# Patient Record
Sex: MALE | Race: WHITE | NOT HISPANIC OR LATINO | Employment: OTHER | URBAN - METROPOLITAN AREA
[De-identification: names, ages, dates, MRNs, and addresses within clinical notes are randomized per-mention and may not be internally consistent; named-entity substitution may affect disease eponyms.]

---

## 2017-02-03 ENCOUNTER — OFFICE VISIT (OUTPATIENT)
Dept: FAMILY MEDICINE | Facility: CLINIC | Age: 82
End: 2017-02-03

## 2017-02-03 VITALS
HEART RATE: 92 BPM | DIASTOLIC BLOOD PRESSURE: 75 MMHG | OXYGEN SATURATION: 95 % | WEIGHT: 153 LBS | TEMPERATURE: 97.5 F | BODY MASS INDEX: 24.33 KG/M2 | SYSTOLIC BLOOD PRESSURE: 129 MMHG

## 2017-02-03 DIAGNOSIS — Z01.00 VISION SCREEN WITHOUT ABNORMAL FINDINGS: Primary | ICD-10-CM

## 2017-02-03 PROBLEM — N18.30 CKD (CHRONIC KIDNEY DISEASE) STAGE 3, GFR 30-59 ML/MIN (H): Status: ACTIVE | Noted: 2017-02-03

## 2017-02-03 NOTE — PROGRESS NOTES
HPI:       Jerardo Paredes is a 89 year old  male who presents to address the following concerns:    1) Forms:  [facility]  -dx supporting: genralized decline, hx CVA, mild cognitive decline    2) Labs; had complimentary labs and ecg through outside agency and Cr 1.5.  Last Cr here below    3) Urinary issues: chronic.  Discussed straight cath in past for sx retention but has not followed up for education.  Tried to get home care nurse to assist but this was not possible.  [facility] agreed to have nurse see for education.  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    Current Outpatient Prescriptions   Medication     tamsulosin (FLOMAX) 0.4 MG 24 hr capsule     aspirin 81 MG chewable tablet     simvastatin (ZOCOR) 40 MG tablet     No current facility-administered medications for this visit.              Review of Systems:   ROS as described above.  Denies F/S/C/N/V/SOB/CP          Physical Exam:     Filed Vitals:    02/03/17 1024   BP: 129/75   Pulse: 92   Temp: 97.5  F (36.4  C)   TempSrc: Oral   Weight: 153 lb (69.4 kg)   SpO2: 95%     Body mass index is 24.33 kg/(m^2).    GEN: patient sitting comfortably in NAD.  Frail appearing  HEEN:  Head is atraumatic, normocephalic, eyes anicteric, mucous membranes moist  CV: RRR w/o M/R/G  PULM: CTAB without w/r/r  ABD: soft, nontender, bowel sounds present  NEURO: Alert and oriented x3. No focal motor abnormalities. Face symmetric.  PSYCH: appropriate  SKIN: No rashes, bruising, or other lesions      Results for orders placed or performed in visit on 04/18/16   Procalcitonin ([facility])   Result Value Ref Range    Procalcitonin <0.05 0.00 - 0.49 ng/mL    Narrative    Test performed by:  [facility]OR[facility]  [address] [place] [address]   CBC with Plt ([facility])   Result Value Ref Range    WBC 6.7 4.0 - 11.0 K/uL    RBC 4.77 4.40 - 5.90 M/uL    Hemoglobin 14.6 13.3 - 17.7 g/dL    Hematocrit 46.3 40.0 - 53.0 %    MCV 97.1 78.0 - 100.0 fL    MCH 30.6 26.5 - 35.0 pg    MCHC 31.5 (L) 32.0 - 36.0 g/dL    Platelets 190.0 150.0 - 450.0 K/uL   Basic Metabolic Panel ([facility])  - Results < 1 hr   Result Value Ref Range    Glucose 161.0 (H) 60.0 - 109.0 mg/dL    Urea Nitrogen 23.0 7.0 - 30.0 mg/dL    Creatinine 1.4 0.8 - 1.5 mg/dL    Sodium 138.0 133.0 - 144.0 mmol/L    Potassium 4.2 3.4 - 5.3 mmol/L    Chloride 106.0 94.0 - 109.0 mmol/L    Carbon Dioxide 30.0 20.0 - 32.0 mmol/L    Calcium 9.1 8.5 - 10.4 mg/dL    eGFR Calculated (Non Black Reference) 50.6 (L) >60.0 mL/min    eGFR Calculated (Black Reference) 61.2 >60.0 mL/min       Assessment and Plan     1) Forms: [facility].  Completed today with patient in room  2) CKD: last Cr 1.5 with eGFR 32.  Consistent with slow decline previously noted.  Continue to monitor.  Continue to encourage hydration  3) Urinary incontinence: patient would like to try straight cath.  Will help arrange for Ed with [facility]. Daugther to take patient.  Have discussed risk benefits PSA and have declined in the past.  Patient has enlarged prostate on my exam    Options for treatment and follow-up care were reviewed with the patient and/or guardian. Jerardo Paredes and/or  guardian engaged in the decision making process and verbalized understanding of the options discussed and agreed with the final plan.    Gloria Deshpande MD

## 2017-02-03 NOTE — MR AVS SNAPSHOT
After Visit Summary   2/3/2017    Jerardo Paredes    MRN: [id number]           Patient Information     Date Of Birth          4/3/1926        Visit Information        Provider Department      2/3/2017 10:20 AM Gloria Deshpande MD [facility]        Today's Diagnoses     Vision screen without abnormal findings    -  1       Care Instructions    ~ Recommend making an appointment with [facility] for catheter teaching at you and your family's earliest convenience.   ~ Forms for [facility] filled out.    ~~~~~~~~~~~~~~~~~~~~~~~~~~~~~~~~~~~~~~~~~~~~~~~~~~~~~~~~~~~~~~~~~~~~~~~~~~~~   Your medication list is printed, please keep this with you, it is helpful to bring this current list to any other medical appointments, the emergency room or hospital.    If you had lab testing today and your results are reassuring or normal they will be be mailed to you within 7 days.     If the lab tests need quick action we will call you with the results.   The phone number we will call with results is # [phone] (home) . If this is not the best number please call our clinic and change the number.    If you need any refills please call your pharmacy and they will contact us.    If you have any further concerns or wish to schedule another appointment you must call our office during normal business hours  [phone] (8-5:00 M-F)  If you have urgent medical questions that cannot wait  you may also call [phone] at any time of day.  If you have a medical emergency please call [phone].    Thank you for coming to [facility].          Follow-ups after your visit        Who to contact     Please call your clinic at [phone] to:    Ask questions about your health    Make or cancel appointments    Discuss your medicines    Learn about your test results    Speak to your doctor   If you have compliments or concerns about an experience at your clinic, or if you wish to file a complaint, please  contact [facility] Patient Relations at [phone] or email us at [email or web address]         Additional Information About Your Visit        [email or web address].[email or web address] Information     DataStax is an electronic gateway that provides easy, online access to your medical records. With DataStax, you can request a clinic appointment, read your test results, renew a prescription or communicate with your care team.     To sign up for DataStax visit the website at [email or web address]   You will be asked to enter the access code listed below, as well as some personal information. Please follow the directions to create your username and password.     Your access code is: [email or web address]  Expires: 2017 10:50 AM     Your access code will  in 90 days. If you need help or a new code, please contact your [facility] or call [phone] for assistance.        Care EveryWhere ID     This is your Care EveryWhere ID. This could be used by other organizations to access your [facility] medical records  [id number]        Your Vitals Were     Pulse Temperature Pulse Oximetry             92 97.5  F (36.4  C) (Oral) 95%          Blood Pressure from Last 3 Encounters:   17 129/75   10/25/16 131/68   16 114/70    Weight from Last 3 Encounters:   17 153 lb (69.4 kg)   10/25/16 149 lb 9.6 oz (67.858 kg)   16 149 lb 3.2 oz (67.677 kg)              We Performed the Following     SCREENING, VISUAL ACUITY, QUANTITATIVE, BILAT        Primary Care Provider Office Phone # Fax #    Gloria Deshpande [phone]       [facility] [address] [place] [address]        Thank you!     Thank you for choosing [facility]  for your care. Our goal is always to provide you with excellent care. Hearing back from our patients is one way we can continue to improve our services. Please take a few minutes to complete the written  survey that you may receive in the mail after your visit with us. Thank you!             Your Updated Medication List - Protect others around you: Learn how to safely use, store and throw away your medicines at www.disposemymeds.org.          This list is accurate as of: 2/3/17 10:50 AM.  Always use your most recent med list.                   Brand Name Dispense Instructions for use    aspirin 81 MG chewable tablet      Take 162 mg by mouth daily       simvastatin 40 MG tablet    ZOCOR    90 tablet    Take 1 tablet (40 mg) by mouth At Bedtime       tamsulosin 0.4 MG capsule    FLOMAX    60 capsule    Take 2 capsules (0.8 mg) by mouth daily

## 2017-02-03 NOTE — PATIENT INSTRUCTIONS
~ Recommend making an appointment with [facility] for catheter teaching at you and your family's earliest convenience.   ~ Forms [facility] filled out.    ~~~~~~~~~~~~~~~~~~~~~~~~~~~~~~~~~~~~~~~~~~~~~~~~~~~~~~~~~~~~~~~~~~~~~~~~~~~~   Your medication list is printed, please keep this with you, it is helpful to bring this current list to any other medical appointments, the emergency room or hospital.    If you had lab testing today and your results are reassuring or normal they will be be mailed to you within 7 days.     If the lab tests need quick action we will call you with the results.   The phone number we will call with results is # [phone] (home) . If this is not the best number please call our clinic and change the number.    If you need any refills please call your pharmacy and they will contact us.    If you have any further concerns or wish to schedule another appointment you must call our office during normal business hours  [phone] (8-5:00 M-F)  If you have urgent medical questions that cannot wait  you may also call [phone] at any time of day.  If you have a medical emergency please call [phone].    Thank you for coming to [facility].      Urology Referral from 12/2016 has be faxed to [facility] [place]. Daughter Attila will call to schedule appointment for education of self catheterization.   ---GUIDO Coy 2/3/2017

## 2017-05-01 ENCOUNTER — MEDICAL CORRESPONDENCE (OUTPATIENT)
Dept: HEALTH INFORMATION MANAGEMENT | Facility: CLINIC | Age: 82
End: 2017-05-01

## 2017-05-10 ENCOUNTER — OFFICE VISIT (OUTPATIENT)
Dept: FAMILY MEDICINE | Facility: CLINIC | Age: 82
End: 2017-05-10

## 2017-05-10 VITALS
OXYGEN SATURATION: 95 % | BODY MASS INDEX: 24.01 KG/M2 | HEIGHT: 67 IN | TEMPERATURE: 97.4 F | WEIGHT: 153 LBS | HEART RATE: 74 BPM | DIASTOLIC BLOOD PRESSURE: 71 MMHG | SYSTOLIC BLOOD PRESSURE: 120 MMHG | RESPIRATION RATE: 18 BRPM

## 2017-05-10 DIAGNOSIS — Z23 IMMUNIZATION DUE: ICD-10-CM

## 2017-05-10 DIAGNOSIS — Z01.818 PREOP GENERAL PHYSICAL EXAM: Primary | ICD-10-CM

## 2017-05-10 LAB
BUN SERPL-MCNC: 17 MG/DL (ref 7–30)
CALCIUM SERPL-MCNC: 8.9 MG/DL (ref 8.5–10.4)
CHLORIDE SERPLBLD-SCNC: 105 MMOL/L (ref 94–109)
CO2 SERPL-SCNC: 27 MMOL/L (ref 20–32)
CREAT SERPL-MCNC: 1.2 MG/DL (ref 0.8–1.5)
EGFR CALCULATED (BLACK REFERENCE): 73 ML/MIN
EGFR CALCULATED (NON BLACK REFERENCE): 60.3 ML/MIN
GLUCOSE SERPL-MCNC: 97 MG/DL (ref 60–109)
POTASSIUM SERPL-SCNC: 4.5 MMOL/L (ref 3.4–5.3)
SODIUM SERPL-SCNC: 141 MMOL/L (ref 133–144)

## 2017-05-10 NOTE — PATIENT INSTRUCTIONS
Pre-op faxed to fax number :  [phone]  Location :  [facility]   Date of Surgery :  5/17/17  By/Date :  Ese Hopeshoaib [place] [facility]/5/10/17             Presurgery Checklist  You are scheduled to have surgery. The healthcare staff will try to make your stay comfortable. Use the guidelines below to remind yourself what to do before surgery. Be sure to follow any specific pre-op instructions from your surgeon or nurse.   Preparing for Surgery  Ask your surgeon if you ll need a blood transfusion during surgery and if so, how to prepare for it. In some cases, you can donate blood before surgery. If needed, this blood can be given back (transfused) to you during or after surgery.  If you are having abdominal surgery, ask what you need to do to clear your bowel.  Tell your surgeon if you have allergies to any medications or foods.  Arrange for an adult family member or friend to drive you home after surgery. If possible, have someone ready to help you at home as you recover.  Call the surgeon if you get a cold, fever, sore throat, diarrhea, or other health problem just before surgery. Your surgeon can decide whether or not to postpone the surgery.  Medications  Tell your surgeon about all medications you take, including prescription and over-the-counter products such as herbal remedies and vitamins. Ask if you should continue taking them.  If you take ibuprofen, naproxen, or  blood thinners  such as aspirin, clopidogrel (Plavix), or warfarin (Coumadin), ask your surgeon whether you should stop taking them and how long before surgery you should stop.  You may be told to take antibiotics just before surgery to prevent infection. If so, follow instructions carefully on how to take them.  If you are told to take medications called anticoagulants to prevent blood clots after surgery, be sure to follow the instructions on how to take them.  Stop Smoking  If you smoke, healing may take longer. So at least 2 week(s)  before surgery, stop smoking.  Bathing or Showering Before Surgery  If instructed, wash with antibacterial soap. Afterward, do not use lotions or powders.  If you are having surgery on the head, you may be asked to shampoo with antibacterial soap. Follow instructions for doing so.  Do Not Remove Hair from the Surgery Site  Do not shave hair from the incision site, unless you are given specific instructions to do so. Usually, if hair needs to be removed, it will be done at the hospital right before surgery.  Don t Eat or Drink  Your doctor will tell you when to stop eating and drinking. If you do not follow your doctor's instructions, your procedure may be postponed or rescheduled for another day.  If your surgeon tells you to continue any medications, take them with small sips of water.  You can brush your teeth and rinse your mouth, but don t swallow any water.  Day of Surgery  Do not wear makeup. Do not use perfume, deodorant, or hairspray. Remove nail polish and artificial nails.  Leave jewelry (including rings), watches, and other valuables at home.  Be sure to bring health insurance cards or forms and a photo ID.  Bring a list of your medications (include the name, dose, how often you take them, and the time last dose was taken).  Arrive on time at the hospital or surgery facility.

## 2017-05-10 NOTE — MR AVS SNAPSHOT
After Visit Summary   5/10/2017    Jerardo Paredes    MRN: [id number]           Patient Information     Date Of Birth          4/3/1926        Visit Information        Provider Department      5/10/2017 10:40 AM Alesia Barker MD [facility]        Today's Diagnoses     Preop general physical exam    -  1      Care Instructions      Presurgery Checklist  You are scheduled to have surgery. The healthcare staff will try to make your stay comfortable. Use the guidelines below to remind yourself what to do before surgery. Be sure to follow any specific pre-op instructions from your surgeon or nurse.   Preparing for Surgery  Ask your surgeon if you ll need a blood transfusion during surgery and if so, how to prepare for it. In some cases, you can donate blood before surgery. If needed, this blood can be given back (transfused) to you during or after surgery.  If you are having abdominal surgery, ask what you need to do to clear your bowel.  Tell your surgeon if you have allergies to any medications or foods.  Arrange for an adult family member or friend to drive you home after surgery. If possible, have someone ready to help you at home as you recover.  Call the surgeon if you get a cold, fever, sore throat, diarrhea, or other health problem just before surgery. Your surgeon can decide whether or not to postpone the surgery.  Medications  Tell your surgeon about all medications you take, including prescription and over-the-counter products such as herbal remedies and vitamins. Ask if you should continue taking them.  If you take ibuprofen, naproxen, or  blood thinners  such as aspirin, clopidogrel (Plavix), or warfarin (Coumadin), ask your surgeon whether you should stop taking them and how long before surgery you should stop.  You may be told to take antibiotics just before surgery to prevent infection. If so, follow instructions carefully on how to take them.  If you are told to take  medications called anticoagulants to prevent blood clots after surgery, be sure to follow the instructions on how to take them.  Stop Smoking  If you smoke, healing may take longer. So at least 2 week(s) before surgery, stop smoking.  Bathing or Showering Before Surgery  If instructed, wash with antibacterial soap. Afterward, do not use lotions or powders.  If you are having surgery on the head, you may be asked to shampoo with antibacterial soap. Follow instructions for doing so.  Do Not Remove Hair from the Surgery Site  Do not shave hair from the incision site, unless you are given specific instructions to do so. Usually, if hair needs to be removed, it will be done at the hospital right before surgery.  Don t Eat or Drink  Your doctor will tell you when to stop eating and drinking. If you do not follow your doctor's instructions, your procedure may be postponed or rescheduled for another day.  If your surgeon tells you to continue any medications, take them with small sips of water.  You can brush your teeth and rinse your mouth, but don t swallow any water.  Day of Surgery  Do not wear makeup. Do not use perfume, deodorant, or hairspray. Remove nail polish and artificial nails.  Leave jewelry (including rings), watches, and other valuables at home.  Be sure to bring health insurance cards or forms and a photo ID.  Bring a list of your medications (include the name, dose, how often you take them, and the time last dose was taken).  Arrive on time at the hospital or surgery facility.        Follow-ups after your visit        Who to contact     Please call your clinic at [phone] to:    Ask questions about your health    Make or cancel appointments    Discuss your medicines    Learn about your test results    Speak to your doctor   If you have compliments or concerns about an experience at your clinic, or if you wish to file a complaint, please contact [facility] Patient Relations at  "[phone] or email us at [email or web address]         Additional Information About Your Visit        Snap Trends Information     Snap Trends is an electronic gateway that provides easy, online access to your medical records. With Snap Trends, you can request a clinic appointment, read your test results, renew a prescription or communicate with your care team.     To sign up for Snap Trends visit the website at [email or web address]   You will be asked to enter the access code listed below, as well as some personal information. Please follow the directions to create your username and password.     Your access code is: [email or web address]  Expires: 2017 11:42 AM     Your access code will  in 90 days. If you need help or a new code, please contact your [facility] or call [phone] for assistance.        Care EveryWhere ID     This is your Care EveryWhere ID. This could be used by other organizations to access your [place] medical records  [id number]        Your Vitals Were     Pulse Temperature Respirations Height Pulse Oximetry BMI (Body Mass Index)    74 97.4  F (36.3  C) (Tympanic) 18 5' 6.5\" (168.9 cm) 95% 24.32 kg/m2       Blood Pressure from Last 3 Encounters:   05/10/17 120/71   17 129/75   10/25/16 131/68    Weight from Last 3 Encounters:   05/10/17 153 lb (69.4 kg)   17 153 lb (69.4 kg)   10/25/16 149 lb 9.6 oz (67.9 kg)              We Performed the Following     Basic Metabolic Panel ([facility]) - Results < 1 hr        Primary Care Provider Office Phone # Fax #    Gloria Deshpande [phone]       [facility] [address] [place] [id number]        Thank you!     Thank you for choosing [facility]  for your care. Our goal is always to provide you with excellent care. Hearing back from our patients is one way we can continue to improve our services. Please take a few minutes to complete the written survey " that you may receive in the mail after your visit with us. Thank you!             Your Updated Medication List - Protect others around you: Learn how to safely use, store and throw away your medicines at www.disposemymeds.org.          This list is accurate as of: 5/10/17 11:42 AM.  Always use your most recent med list.                   Brand Name Dispense Instructions for use    aspirin 81 MG chewable tablet      Take 162 mg by mouth daily       simvastatin 40 MG tablet    ZOCOR    90 tablet    Take 1 tablet (40 mg) by mouth At Bedtime       tamsulosin 0.4 MG capsule    FLOMAX    60 capsule    Take 2 capsules (0.8 mg) by mouth daily

## 2017-05-10 NOTE — PROGRESS NOTES
[facility] [address] [place] [address]  Phone: [phone]  Fax: [phone]    5/10/2017    Adult PRE-OP Evaluation:    Jerardo Paredes, 4/3/1926 presents for pre-operative evaluation and assessment as requested by Dr. Perez, prior to undergoing surgery/procedure for treatment of  Urinary retention .    Proposed procedure: Stage I inter stim implant device    Date of Surgery/ Procedure: 5/17/17  Hospital/Surgical Facility: [facility], Fax: [phone]     Primary Physician: Gloria Deshpande  Type of Anesthesia Anticipated: Local with MAC  History of anesthesia complications: NONE  History of  abnormal bleeding: NONE   History of blood transfusions: NO  Patient has a Health Care Directive or Living Will:  YES DNR/I    Preoperative Questions   1. YES - Do you have a history of heart attack, stroke, stent, bypass or surgery on an artery in the head, neck, heart or legs? Yes, stroke in 2014.   2. NO - Do you ever have any pain or discomfort in your chest?  3. NO - Have you ever had a severe pain across the front of your chest lasting for half an hour or more?  4. NO - Do you have a history of Congestive Heart Failure?  5. NO - Are you troubled by shortness of breath when: walking on the level/ up a slight hill/ at night?  6. NO - Does your chest ever sound wheezy or whistling?  7. NO - Do you currently have a cold, bronchitis or other respiratory infection?  8. NO - Have you had a cold, bronchitis or other respiratory infection within the last 2 weeks?  9. NO - Do you usually have a cough?  10. NO - Do you sometimes get pains in the calves of your legs when you walk?  11. NO - Do you or anyone in your family have previous history of blood clots?  12. NO - Do you or does anyone in your family have a serious bleeding problem such as prolonged bleeding following surgeries or cuts?  13. NO - Have you ever had problems with anemia or been told to take iron pills?  14. NO - Have  you had any abnormal blood loss such as black, tarry or bloody stools, or abnormal vaginal bleeding?  15. NO - Have you ever had a blood transfusion?  16. NO - Have you or any of your relatives ever had problems with anesthesia?  17. NO - Do you have sleep apnea, excessive snoring or daytime drowsiness?  18. NO - Do you have any prosthetic heart valves?  19. NO - Do you have prosthetic joints?  20. NO - Is there any chance that you may be pregnant?    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     OTC products: None, except as noted above    Allergies   Allergen Reactions     No Known Drug Allergy      Latex Allergy: NO    Social History     Social History     Marital status: Single     Spouse name: N/A     Number of children: N/A     Years of education: N/A     Social History Main Topics     Smoking status: Former Smoker     Smokeless tobacco: Never Used      Comment: quit for a long time per pt.     Alcohol use 0.0 oz/week     0 Standard drinks or equivalent per week      Comment: Occasionally.      Drug use: No     Sexual activity: Not Asked       REVIEW OF SYSTEMS:   Constitutional, HEENT, cardiovascular, pulmonary, gi and gu systems are negative, except as otherwise noted.    EXAM:   Patient Vitals for the past 24 hrs:   BP Temp Temp src Pulse Resp SpO2 Height Weight  "  05/10/17 [facility] 120/71 97.4  F (36.3  C) Tympanic 74 18 95 % 5' 6.5\" (168.9 cm) 153 lb (69.4 kg)     Body mass index is 24.32 kg/(m^2).  GENERAL: healthy, alert and no distress  EYES: Eyes grossly normal to inspection, extraocular movements - intact, and PERRL  HENT: ear canals- normal;  Nose- normal; Mouth- no ulcers, no lesions  NECK: no tenderness, no adenopathy, no asymmetry, no masses, no stiffness; thyroid- normal to palpation  RESP: lungs clear to auscultation - no rales, no rhonchi, no wheezes  CV: regular rates and rhythm, normal S1 S2, no S3 or S4 and no murmur, no click or rub -  ABDOMEN: soft, no tenderness, no  hepatosplenomegaly, no masses, normal bowel sounds  MS: extremities- no gross deformities noted, no edema  SKIN: no suspicious lesions, no rashes  NEURO: strength and tone- normal, sensory exam- grossly normal, mentation- intact, speech- normal  PSYCH: Alert and oriented times 3; speech- coherent , normal rate and volume; able to articulate logical thoughts    DIAGNOSTICS:      EKG: First degree AV block, Sinus rhythm with vent rate of 59 bpm, Slightly leftward axis, no LVH by voltage criteria, Right Bundle Branch Block, unchanged from previous tracing description dated 3/19/14 available in Care Everywhere but cannot see the image.     RISK ASSESSMENT:     Cardiovascular Risk:  -Patient is able to walk up a flight of stairs without chest pain.  -The patient does not have chest pain with exertion.  -Patient does not have a history of congestive heart failure.    -The patient does have a history of stroke and does not have a history of valvular disease.    Pulmonary Risk:  -In terms of risk factors for pulmonary complication, the patient is older then 60    Perioperative Complications:  -The patient does not have a history of bleeding or clotting problems in the past.    -The patient has not had complications from surgeries.    -The patient does not have a family history of any anesthesia or " surgical complications.      IMPRESSION:   Reason for surgery/procedure: urinary retention.     The proposed surgical procedure is considered LOW risk.    For above listed surgery and anesthesia:   Patient is at low risk for surgery/procedure and perioperative/procedure complications.    RECOMMENDATIONS:     Labs:  BMP is wnl. Cr stable at 1.2, electrolytes wnl.     Fasting:  NPO for 12 hours prior to surgery    Preop Plan:  --Approval given to proceed with proposed procedure, without further diagnostic evaluation  Patient is not currently taking any medications.   Recommend he not start taking his aspirin.     Medications:  Patient should hold their regular medications the morning of surgery unless otherwise instructed.    Hold aspirin 7 days prior to surgery.      Alesia Barker MD    Please contact our office if there are any further questions or information required about this patient.    Precepted today with: Clint Guan MD    Preceptor Attestation:  I saw and evaluated the patient.  I reviewed the resident physician's history, exam, and treatment plan; and I agree with the documentation by the resident physician.  Supervising Physician:  Clint Guan MD      I reviewed and interpreted the EKG at the time of visit with Dr. Barker.  Clint Guan MD

## 2017-05-16 ASSESSMENT — MIFFLIN-ST. JEOR: SCORE: 1279.69

## 2017-05-17 ENCOUNTER — SURGERY - HEALTHEAST (OUTPATIENT)
Dept: SURGERY | Facility: HOSPITAL | Age: 82
End: 2017-05-17

## 2017-05-17 ENCOUNTER — ANESTHESIA - HEALTHEAST (OUTPATIENT)
Dept: SURGERY | Facility: HOSPITAL | Age: 82
End: 2017-05-17

## 2017-06-02 ENCOUNTER — ANESTHESIA - HEALTHEAST (OUTPATIENT)
Dept: SURGERY | Facility: HOSPITAL | Age: 82
End: 2017-06-02

## 2017-06-02 ASSESSMENT — MIFFLIN-ST. JEOR: SCORE: 1266.09

## 2017-06-05 ENCOUNTER — SURGERY - HEALTHEAST (OUTPATIENT)
Dept: SURGERY | Facility: HOSPITAL | Age: 82
End: 2017-06-05

## 2018-01-30 ENCOUNTER — TELEPHONE (OUTPATIENT)
Dept: FAMILY MEDICINE | Facility: CLINIC | Age: 83
End: 2018-01-30

## 2018-01-30 NOTE — TELEPHONE ENCOUNTER
I got a message from Stevan, the  staff about the pt coming in for a Medicare Wellness check up.  I called the pt to ask him the questionnaire, talked to the pt daughter. She stated that if I could mail her the questionnaire and she can ask her father the questions this weekend.  I mailed the pt the questionnaire, and she will ask her father and bring it in with her when they come in for the appointment.

## 2018-02-23 ENCOUNTER — TELEPHONE (OUTPATIENT)
Dept: FAMILY MEDICINE | Facility: CLINIC | Age: 83
End: 2018-02-23
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
## 2018-02-23 NOTE — TELEPHONE ENCOUNTER
[facility] phone call message- general phone call:    Reason for call: Patients daughter is calling to give an update on the patient as she would not like to discuss these changes in front of the pt on his next upcoming appt. Please call her at [phone].    Return call needed: Yes    OK to leave a message on voice mail? Yes    Primary language: English      needed? No    Call taken on February 23, 2018 at 10:41 AM by Gricelda Garcia  Patients daughter is calling to give an update on the patient as she would not like to discuss these changes in front of the pt on his next upcoming appt:

## 2018-03-05 ENCOUNTER — OFFICE VISIT (OUTPATIENT)
Dept: FAMILY MEDICINE | Facility: CLINIC | Age: 83
End: 2018-03-05
Payer: COMMERCIAL

## 2018-03-05 VITALS
WEIGHT: 152.6 LBS | RESPIRATION RATE: 16 BRPM | HEART RATE: 84 BPM | SYSTOLIC BLOOD PRESSURE: 124 MMHG | BODY MASS INDEX: 24.26 KG/M2 | TEMPERATURE: 97.5 F | DIASTOLIC BLOOD PRESSURE: 77 MMHG | OXYGEN SATURATION: 94 %

## 2018-03-05 DIAGNOSIS — Z00.00 WELLNESS EXAMINATION: Primary | ICD-10-CM

## 2018-03-05 DIAGNOSIS — M17.31 POST-TRAUMATIC OSTEOARTHRITIS OF RIGHT KNEE: Primary | ICD-10-CM

## 2018-03-05 DIAGNOSIS — Z00.00 ROUTINE GENERAL MEDICAL EXAMINATION AT A HEALTH CARE FACILITY: ICD-10-CM

## 2018-03-05 NOTE — MR AVS SNAPSHOT
After Visit Summary   3/5/2018    Jerardo Paredes    MRN: [id number]           Patient Information     Date Of Birth          4/3/1926        Visit Information        Provider Department      3/5/2018 10:00 AM Gloria Deshpande MD [facility]        Today's Diagnoses     Post-traumatic osteoarthritis of right knee    -  1      Care Instructions     PERSONAL PREVENTIVE SERVICES PLAN - SERVICES     Review these tests with your medical staff then decide which ones you want and take this page home for your reference      SCREENING TESTS     Description   Year of Last Screening   Recommended Today?   Heart disease screening blood tests    Cholesterol level Reducing cholesterol can reduce your risk of heart attacks by 25%.  Screening is recommended yearly if you are at risk of heart disease otherwise every 4-5 years 2014 No; is up to date.   Diabetes screening tests    Hemoglobin A1c blood test   Finding and treating diabetes early can reduce complications.  Screening recommended/covered yearly if you have high blood pressure, high cholesterol, obesity (BMI >30), or a history of high blood glucose tests; or 2 of the following: family history of diabetes, overweight (BMI >25 but <30), age 65 years or older, and a history of diabetes of pregnancy or gave birth to baby weighing more than 9 lbs. 2012 Yes; Recommended    Hepatitis B screening Finding hepatitis B early can reduce complications.  Screening is recommended for persons with selected risk factors.  No: is not indicated today.   Hepatitis C screening Finding hepatitis C early can reduce complications.  Screening is recommended for all persons born from 1945 through 1965 and for those with selected other risk factors.   No: is not indicated today.   HIV screening Finding HIV early can reduce complications.  Screening is recommended for persons with risk factors for HIV infection.  No: is not indicated today.   Glaucoma screening Early  detection of glaucoma can prevent blindness.   Please talk to your eye doctor about this.       SCREENING TESTS     Description   Year of Last Screening   Recommended Today?   Colorectal cancer screening    Fecal occult blood test     Screening colonoscopy Screening for colon cancer has been shown to reduce death from colon cancer by 25-30%. Screening recommended to start at 50 years and continuing until age 75 years.    No: is not indicated today.   Breast Cancer Screening (women)    Mammogram Mammogram screening for breast cancer has been shown to reduce the risk of dying from breast cancer and prolong life. Screening is recommended every 1-2 years for women aged 50 to 74 years.   No: is not indicated today.   Cervical Cancer screening (women)    Pap Cervical pap smears can reduce cervical cancer. Screening is recommended annually if high risk (history of abnormal pap smears) otherwise every 2-3 years, stop screening at 65 years of age if history of normal paps.  No: is not indicated today.   Screening for Osteoporosis:  Bone mass measurements (women)    Dexa Scan Screening and treating Osteoporosis can reduce the risk of hip and spine fractures. Screening is recommended in women 65 years or older and in women and men at risk of osteoporosis.  No: is not indicated today.   Screening for Lung Cancer     Low-dose CT scanning Screening can reduce mortality in persons aged 55-80 who have smoked at least 30 pack-years and who are either still smoking or have quit in the past 15 years.  No: is not indicated today.   Abdominal Aortic Aneurysm (AAA) screening    Ultrasound (US)   An aneurysm treated before rupture is very safe -a ruptured aneurysm can be fatal.  Screening  by US for AAA is limited to patients who meet one of the following criteria:    Men who are 65-75 years old and have smoked more than 100 cigarettes in their lifetime    Anyone with a family history of abdominal aortic aneurysm  No: is not indicated  today.     Here are your recommended immunizations.  Take this home for your reference.                                                    IMMUNIZATIONS Description Recommend today?     Influenza (Flu shot) Prevents flu; should get every year No; is up to date.   PCV 13 Pneumonia vaccination; you get it once No; is up to date.   PPSV 23 Second pneumonia vaccination; usually get it 1 year after PCV 13 No; is up to date.   Zoster (Shingles) Prevents shingles; you get it once  (Check with Part D insurance for coverage, must receive at a pharmacy, not clinic) No; is up to date.   Tetanus Prevents tetanus; once every 10 years Yes; Recommended      Hepatitis B  If you have any of the following risk factors you should be immunized for hepatitis B: severe kidney disease, people who live in the same house as a carrier of Hepatitis B virus, people who live in  institutions (e.g. nursing homes or group homes), homosexual men, patients with hemophilia who received Factor VIII or IX concentrates, abusers of illicit injectable drugs No; is up to date.      PATIENT INSTRUCTIONS    Yearly exam:     See your health care provider every year in order to review changes in your health, review medicines that you take, and discuss preventive care needs such as immunizations and cancer screening.    Get a flu shot each year.     Advance Directives:    If you have not done so, you are encouraged to complete advance directives and/or a living will.   More information about advance directives can be found at: http://www.[place]med.org/advocacy/Key-Issues/Advance-Directives    Nutrition:     Eat at least 5 servings of fruits and vegetables each day.     Eat whole-grain bread, whole-wheat pasta and brown rice instead of white grains and rice.     Talk to your doctor about Calcium and Vitamin D.     Lifestyle:    Exercise for at least 150 minutes a week (30 minutes a day, 5 days a week). This will help you control your weight and prevent disease.      Limit alcohol to one drink per day.     If you smoke, try to quit - your doctor will be happy to help.     Wear sunscreen to prevent skin cancer.     See your dentist every six months for an exam and cleaning.     See your eye doctor every 1 to 2 years to screen for conditions such as glaucoma, macular degeneration and cataracts.                                      Follow-ups after your visit        Who to contact     Please call your clinic at [phone] to:    Ask questions about your health    Make or cancel appointments    Discuss your medicines    Learn about your test results    Speak to your doctor            Additional Information About Your Visit        [facility] Information     [facility] is an electronic gateway that provides easy, online access to your medical records. With [facility], you can request a clinic appointment, read your test results, renew a prescription or communicate with your care team.     To sign up for [facility] visit the website at [email or web address]   You will be asked to enter the access code listed below, as well as some personal information. Please follow the directions to create your username and password.     Your access code is: [email or web address]  Expires: 6/3/2018  9:39 AM     Your access code will  in 90 days. If you need help or a new code, please contact your [facility] or call [phone] for assistance.        Care EveryWhere ID     This is your Care EveryWhere ID. This could be used by other organizations to access your [facility] medical records  [id number]        Your Vitals Were     Pulse Temperature Respirations Pulse Oximetry BMI (Body Mass Index)       84 97.5  F (36.4  C) (Tympanic) 16 94% 24.26 kg/m2        Blood Pressure from Last 3 Encounters:   18 124/77   05/10/17 120/71   17 129/75    Weight from Last 3 Encounters:   18 152 lb 9.6 oz (69.2 kg)   05/10/17 153 lb (69.4 kg)   17 153 lb (69.4 kg)               We Performed the Following     DNR (Do Not Resuscitate)          Today's Medication Changes          These changes are accurate as of 3/5/18 11:05 AM.  If you have any questions, ask your nurse or doctor.               Start taking these medicines.        Dose/Directions    diclofenac 1 % Gel topical gel   Commonly known as:  VOLTAREN   Used for:  Post-traumatic osteoarthritis of right knee   Started by:  Gloria Deshpande MD        Apply 4 grams to knees or 2 grams to hands four times daily using enclosed dosing card.   Quantity:  100 g   Refills:  1            Where to get your medicines      These medications were sent to [facility] [address] [place] [address] [address] AT [place] [address] [address], [facility] [address]     Phone:  [phone]     diclofenac 1 % Gel topical gel                Primary Care Provider Office Phone # Fax #    Gloria Deshpande [phone]       [facility] [address] [place] [address]        Equal Access to Services     [facility]: Hadii aad ku hadasho Soomaali, waaxda luqadaha, qaybta kaalmada adeegyada, waxay idiin hayaan adeeg kharash layasmine . So [facility] [phone].    ATENCIÓN: Si habla español, tiene a maldonado disposición servicios gratuitos de asistencia lingüística. [facility] [phone].    We comply with applicable federal civil rights laws and [place] laws. We do not discriminate on the basis of race, color, national origin, age, disability, sex, sexual orientation, or gender identity.            Thank you!     Thank you for choosing [facility]  for your care. Our goal is always to provide you with excellent care. Hearing back from our patients is one way we can continue to improve our services. Please take a few minutes to complete the written survey that you may receive in the mail after your visit with us. Thank you!             Your Updated Medication List - Protect  others around you: Learn how to safely use, store and throw away your medicines at www.disposemymeds.org.          This list is accurate as of 3/5/18 11:05 AM.  Always use your most recent med list.                   Brand Name Dispense Instructions for use Diagnosis    aspirin 81 MG chewable tablet      Take 162 mg by mouth daily        diclofenac 1 % Gel topical gel    VOLTAREN    100 g    Apply 4 grams to knees or 2 grams to hands four times daily using enclosed dosing card.    Post-traumatic osteoarthritis of right knee       simvastatin 40 MG tablet    ZOCOR    90 tablet    Take 1 tablet (40 mg) by mouth At Bedtime    History of stroke       tamsulosin 0.4 MG capsule    FLOMAX    60 capsule    Take 2 capsules (0.8 mg) by mouth daily    Benign non-nodular prostatic hyperplasia with lower urinary tract symptoms

## 2018-03-05 NOTE — NURSING NOTE
Vision Assessment R eye 20/50, L eye 20/60       Letter to be mailed to Cristela Rosales at [facility] [address] [place] [address]  Copy of letter and handicap form to be mailed to daughter: Attila Carreon at [address] [place] [address]

## 2018-03-05 NOTE — PATIENT INSTRUCTIONS
PERSONAL PREVENTIVE SERVICES PLAN - SERVICES     Review these tests with your medical staff then decide which ones you want and take this page home for your reference      SCREENING TESTS     Description   Year of Last Screening   Recommended Today?   Heart disease screening blood tests    Cholesterol level Reducing cholesterol can reduce your risk of heart attacks by 25%.  Screening is recommended yearly if you are at risk of heart disease otherwise every 4-5 years 2014 No; is up to date.   Diabetes screening tests    Hemoglobin A1c blood test   Finding and treating diabetes early can reduce complications.  Screening recommended/covered yearly if you have high blood pressure, high cholesterol, obesity (BMI >30), or a history of high blood glucose tests; or 2 of the following: family history of diabetes, overweight (BMI >25 but <30), age 65 years or older, and a history of diabetes of pregnancy or gave birth to baby weighing more than 9 lbs. 2012 Yes; Recommended    Hepatitis B screening Finding hepatitis B early can reduce complications.  Screening is recommended for persons with selected risk factors.  No: is not indicated today.   Hepatitis C screening Finding hepatitis C early can reduce complications.  Screening is recommended for all persons born from 1945 through 1965 and for those with selected other risk factors.   No: is not indicated today.   HIV screening Finding HIV early can reduce complications.  Screening is recommended for persons with risk factors for HIV infection.  No: is not indicated today.   Glaucoma screening Early detection of glaucoma can prevent blindness.   Please talk to your eye doctor about this.       SCREENING TESTS     Description   Year of Last Screening   Recommended Today?   Colorectal cancer screening    Fecal occult blood test     Screening colonoscopy Screening for colon cancer has been shown to reduce death from colon cancer by 25-30%. Screening recommended to start at 50  years and continuing until age 75 years.    No: is not indicated today.   Breast Cancer Screening (women)    Mammogram Mammogram screening for breast cancer has been shown to reduce the risk of dying from breast cancer and prolong life. Screening is recommended every 1-2 years for women aged 50 to 74 years.   No: is not indicated today.   Cervical Cancer screening (women)    Pap Cervical pap smears can reduce cervical cancer. Screening is recommended annually if high risk (history of abnormal pap smears) otherwise every 2-3 years, stop screening at 65 years of age if history of normal paps.  No: is not indicated today.   Screening for Osteoporosis:  Bone mass measurements (women)    Dexa Scan Screening and treating Osteoporosis can reduce the risk of hip and spine fractures. Screening is recommended in women 65 years or older and in women and men at risk of osteoporosis.  No: is not indicated today.   Screening for Lung Cancer     Low-dose CT scanning Screening can reduce mortality in persons aged 55-80 who have smoked at least 30 pack-years and who are either still smoking or have quit in the past 15 years.  No: is not indicated today.   Abdominal Aortic Aneurysm (AAA) screening    Ultrasound (US)   An aneurysm treated before rupture is very safe -a ruptured aneurysm can be fatal.  Screening  by US for AAA is limited to patients who meet one of the following criteria:    Men who are 65-75 years old and have smoked more than 100 cigarettes in their lifetime    Anyone with a family history of abdominal aortic aneurysm  No: is not indicated today.     Here are your recommended immunizations.  Take this home for your reference.                                                    IMMUNIZATIONS Description Recommend today?     Influenza (Flu shot) Prevents flu; should get every year No; is up to date.   PCV 13 Pneumonia vaccination; you get it once No; is up to date.   PPSV 23 Second pneumonia vaccination; usually get it  1 year after PCV 13 No; is up to date.   Zoster (Shingles) Prevents shingles; you get it once  (Check with Part D insurance for coverage, must receive at a pharmacy, not clinic) No; is up to date.   Tetanus Prevents tetanus; once every 10 years Yes; Recommended      Hepatitis B  If you have any of the following risk factors you should be immunized for hepatitis B: severe kidney disease, people who live in the same house as a carrier of Hepatitis B virus, people who live in  institutions (e.g. nursing homes or group homes), homosexual men, patients with hemophilia who received Factor VIII or IX concentrates, abusers of illicit injectable drugs No; is up to date.      PATIENT INSTRUCTIONS    Yearly exam:     See your health care provider every year in order to review changes in your health, review medicines that you take, and discuss preventive care needs such as immunizations and cancer screening.    Get a flu shot each year.     Advance Directives:    If you have not done so, you are encouraged to complete advance directives and/or a living will.   More information about advance directives can be found at: http://www.[place]med.org/advocacy/Key-Issues/Advance-Directives    Nutrition:     Eat at least 5 servings of fruits and vegetables each day.     Eat whole-grain bread, whole-wheat pasta and brown rice instead of white grains and rice.     Talk to your doctor about Calcium and Vitamin D.     Lifestyle:    Exercise for at least 150 minutes a week (30 minutes a day, 5 days a week). This will help you control your weight and prevent disease.     Limit alcohol to one drink per day.     If you smoke, try to quit - your doctor will be happy to help.     Wear sunscreen to prevent skin cancer.     See your dentist every six months for an exam and cleaning.     See your eye doctor every 1 to 2 years to screen for conditions such as glaucoma, macular degeneration and cataracts.
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
## 2018-03-05 NOTE — MR AVS SNAPSHOT
After Visit Summary   3/5/2018    Jerardo Paredes    MRN: [id number]           Patient Information     Date Of Birth          4/3/1926        Visit Information        Provider Department      3/5/2018 9:40 AM Rn, Ngiel Ump [facility]        Today's Diagnoses     Wellness examination    -  1       Follow-ups after your visit        Your next 10 appointments already scheduled     Mar 05, 2018  9:40 AM CST   65+ Annual Wellness with Nigel Iraheta Rn   [facility] ([facility])    [address] [place] [address]   [phone]            Mar 05, 2018 10:00 AM CST   65+ Annual Wellness with Gloria Deshpande MD   [facility] ([facility])    [address] [place] [id number] [phone]              Who to contact     Please call your clinic at [phone] to:    Ask questions about your health    Make or cancel appointments    Discuss your medicines    Learn about your test results    Speak to your doctor            Additional Information About Your Visit        MyChart Information     [employer] is an electronic gateway that provides easy, online access to your medical records. With [employer], you can request a clinic appointment, read your test results, renew a prescription or communicate with your care team.     To sign up for [employer] visit the website at [email or web address]   You will be asked to enter the access code listed below, as well as some personal information. Please follow the directions to create your username and password.     Your access code is: [email or web address]  Expires: 6/3/2018  9:39 AM     Your access code will  in 90 days. If you need help or a new code, please contact your [facility] or call [phone] for assistance.        Care EveryWhere ID     This is your Care EveryWhere ID. This could be used by other organizations to access your [facility] medical records  [id number]          Blood Pressure from Last 3 Encounters:   05/10/17 120/71   02/03/17 129/75   10/25/16 131/68    Weight from Last 3 Encounters:   05/10/17 153 lb (69.4 kg)   02/03/17 153 lb (69.4 kg)   10/25/16 149 lb 9.6 oz (67.9 kg)              Today, you had the following     No orders found for display       Primary Care Provider Office Phone # Fax #    Gloria Juliette Deshpande [phone]       [facility] [address] [place] [address]        Equal Access to Services     [facility]: Hadii aad ku hadasho Soomaali, waaxda luqadaha, qaybta kaalmada adeegyada, waxmonica adamsin hayaan adebharat mirza . So [facility] [phone].    ATENCIÓN: Si habla español, tiene a maldonado disposición servicios gratuitos de asistencia lingüística. Llame al [phone].    We comply with applicable federal civil rights laws and [place] laws. We do not discriminate on the basis of race, color, national origin, age, disability, sex, sexual orientation, or gender identity.            Thank you!     Thank you for choosing [facility]  for your care. Our goal is always to provide you with excellent care. Hearing back from our patients is one way we can continue to improve our services. Please take a few minutes to complete the written survey that you may receive in the mail after your visit with us. Thank you!             Your Updated Medication List - Protect others around you: Learn how to safely use, store and throw away your medicines at www.disposemymeds.org.          This list is accurate as of 3/5/18  9:39 AM.  Always use your most recent med list.                   Brand Name Dispense Instructions for use Diagnosis    aspirin 81 MG chewable tablet      Take 162 mg by mouth daily        simvastatin 40 MG tablet    ZOCOR    90 tablet    Take 1 tablet (40 mg) by mouth At Bedtime    History of stroke       tamsulosin 0.4 MG capsule    FLOMAX    60 capsule    Take 2 capsules (0.8 mg) by mouth daily    Benign  non-nodular prostatic hyperplasia with lower urinary tract symptoms

## 2018-03-08 NOTE — NURSING NOTE
FALL RISK ASSESSMENT 3/5/2018   Fallen 2 or more times in the past year? No   Any fall with injury in the past year? No     Timed Get-Up and Go: 22seconds with cane assist.       Health Risk Assessment / Review of Systems     Constitutional: Any fevers or night sweats? No     Eyes:  Vision problems   No     Hearing Do you feel you have hearing loss?  No, has hearing aids, hearing appt tomorrow for recheck.     Cardiovascular: Any chest pain, fast or irregular heart beat, calf pain with walking?     No           Respiratory:   Any breathing problems or cough?   No     Gastrointestinal: Any stomach or stool problems? No         Genitourinary: Do you have difficulty controlling urination?    YES ongoing since 2014 [facility]    Muscles and Joints: Any joint stiffness or soreness?    YES Right knee    Skin: Any concerning lesions or moles?   No     Nervous System: Any loss of strength or feeling, numbness or tingling, shaking, dizziness, or headache?  No     Mental Health: Any depression, anxiety or problems sleeping?    No     Cognition: Do you have any problems with your memory?   YES problem knowing what day it is, finances, has clock nwo that show date and time.            Medical Care     What other specialists or organizations are involved in your medical care?  [facility] [address]  Patient Care Team       Relationship Specialty Notifications Start End    Gloria Deshpande MD PCP - General Family Practice  4/2/15     Phone: [phone] Fax: [phone]         [facility] [address] [place] [id number]          Have you been to the ER or overnight in the hospital in the last year?  No          Social History / Home Safety       Marital Status:  Who lives in your household? Alone    Do you feel threatened or controlled by a partner, ex-partner or anyone in your life? No     Has anyone hurt you physically, for example by pushing, hitting, slapping or kicking you   or  forcing you to have sex? No          Does your home have any of the following safety concerns? Loose rugs in the hallway, no grab bars in the bathroom, no handrails on the stairs or have poorly lit areas?  No     Do you need help with dressing yourself, bathing, eating or getting around your home?No      Do you need help with the phone, transportation, shopping, preparing meals, housework, laundry, medications or managing money? YES Transportation      Risk Behaviors and Healthy Habits     History   Smoking Status     Former Smoker   Smokeless Tobacco     Never Used     Comment: quit for a long time per pt.     How many servings of fruits and vegetables do you eat a day? 1 serving of each    Exercise: 2-3 days/week for an average of 45-60 minutes walking & strength & balance    Do you frequently drive without a seatbelt? No     Do you use tobacco?  No     Do you use any other drugs? No         Do you use alcohol?Yes  Number of drinks per day 0  Number of drinking days a week 1      Frailty Assessment            How difficult is walking from one room to the other on the same level?not  Yes=moderate or severe No     Is it difficult to lift or carry something as heavy as 10 pounds?not  Yes=moderate or severe No     Have you lost 10 or more pounds unintentionally in the previous year? No      Compared with most (men/women) your age, would you say  that you are more active, less active, or about the same? more       A doctor told the patient they had the following illnesses:  Stroke & skin cancer      Advance Directives:   Discussed with patient and family as appropriate. Has patient  completed advance directives and/or a living will? yes    Discussed nutrition, increasing daily intake of fruits and vegetables.   Megan Pineda RN

## 2018-03-16 ENCOUNTER — DOCUMENTATION ONLY (OUTPATIENT)
Dept: FAMILY MEDICINE | Facility: CLINIC | Age: 83
End: 2018-03-16

## 2018-03-16 NOTE — PROGRESS NOTES
Letter to daughter and senior living apartments sent.  Daughter, Attila, notified of same.  Home = [phone] and may leave a Msg. Work number, [phone] you will have to ask for her.

## 2018-04-24 ENCOUNTER — TRANSFERRED RECORDS (OUTPATIENT)
Dept: HEALTH INFORMATION MANAGEMENT | Facility: CLINIC | Age: 83
End: 2018-04-24

## 2018-08-01 ENCOUNTER — OFFICE VISIT (OUTPATIENT)
Dept: FAMILY MEDICINE | Facility: CLINIC | Age: 83
End: 2018-08-01
Payer: COMMERCIAL

## 2018-08-01 VITALS
HEIGHT: 67 IN | HEART RATE: 64 BPM | OXYGEN SATURATION: 97 % | RESPIRATION RATE: 20 BRPM | SYSTOLIC BLOOD PRESSURE: 132 MMHG | BODY MASS INDEX: 23.26 KG/M2 | DIASTOLIC BLOOD PRESSURE: 77 MMHG | TEMPERATURE: 96.6 F | WEIGHT: 148.2 LBS

## 2018-08-01 DIAGNOSIS — I49.9 IRREGULAR HEART BEAT: ICD-10-CM

## 2018-08-01 DIAGNOSIS — R35.0 URINARY FREQUENCY: Primary | ICD-10-CM

## 2018-08-01 LAB
ANION GAP SERPL CALCULATED.3IONS-SCNC: 6 MMOL/L (ref 5–18)
BACTERIA: NORMAL
BILIRUBIN UR: NEGATIVE
BLOOD UR: ABNORMAL
BUN SERPL-MCNC: 20 MG/DL (ref 8–28)
CALCIUM SERPL-MCNC: 8.7 MG/DL (ref 8.5–10.5)
CASTS: NORMAL /LPF
CHLORIDE SERPL-SCNC: 107 MMOL/L (ref 98–107)
CLARITY, URINE: CLEAR
CO2 SERPL-SCNC: 29 MMOL/L (ref 22–31)
COLOR UR: YELLOW
CREAT SERPL-MCNC: 1.25 MG/DL (ref 0.7–1.3)
CRYSTAL URINE: NORMAL /LPF
EPITHELIAL CELLS UR: NORMAL /LPF (ref 0–2)
GLUCOSE SERPL-MCNC: 81 MG/DL (ref 70–125)
GLUCOSE URINE: NEGATIVE
KETONES UR QL: NEGATIVE
LEUKOCYTE ESTERASE UR: NEGATIVE
MAGNESIUM SERPL-MCNC: 2.1 MG/DL (ref 1.8–2.6)
MUCOUS URINE: NORMAL LPF
NITRITE UR QL STRIP: NEGATIVE
PH UR STRIP: 5 [PH] (ref 5–7)
PHOSPHATE SERPL-MCNC: 3.2 MG/DL (ref 2.5–4.5)
POTASSIUM SERPL-SCNC: 4.3 MMOL/L (ref 3.5–5)
PROTEIN UR: ABNORMAL
RBC URINE: <2 /HPF
SODIUM SERPL-SCNC: 142 MMOL/L (ref 136–145)
SP GR UR STRIP: 1.02
UROBILINOGEN UR STRIP-ACNC: ABNORMAL
WBC URINE: <2 /HPF

## 2018-08-01 NOTE — PROGRESS NOTES
Preceptor Attestation:   Patient seen, evaluated and discussed with the resident. I personally viewed the EKG and agree with the interpretation documented by the resident. I have verified the content of the note, which accurately reflects my assessment of the patient and the plan of care.  Supervising Physician:Sarah Soriano MD  [facility]

## 2018-08-01 NOTE — MR AVS SNAPSHOT
"              After Visit Summary   2018    Jerardo Paredes    MRN: [id number]           Patient Information     Date Of Birth          4/3/1926        Visit Information        Provider Department      2018 10:40 AM Norma Bishop MD [facility]        Today's Diagnoses     Urinary frequency    -  1    Irregular heart beat           Follow-ups after your visit        Future tests that were ordered for you today     Open Future Orders        Priority Expected Expires Ordered    Holter Monitor 24 hour - Adult Routine  9/15/2018 2018            Who to contact     Please call your clinic at [phone] to:    Ask questions about your health    Make or cancel appointments    Discuss your medicines    Learn about your test results    Speak to your doctor            Additional Information About Your Visit        MyChart Information     [employer] is an electronic gateway that provides easy, online access to your medical records. With [employer], you can request a clinic appointment, read your test results, renew a prescription or communicate with your care team.     To sign up for [employer] visit the website at [email or web address]   You will be asked to enter the access code listed below, as well as some personal information. Please follow the directions to create your username and password.     Your access code is: [email or web address]  Expires: 10/30/2018  5:57 PM     Your access code will  in 90 days. If you need help or a new code, please contact your [facility] or call [phone] for assistance.        Care EveryWhere ID     This is your Care EveryWhere ID. This could be used by other organizations to access your [facility] medical records  [id number]        Your Vitals Were     Pulse Temperature Respirations Height Pulse Oximetry BMI (Body Mass Index)    64 96.6  F (35.9  C) 20 5' 6.5\" (168.9 cm) 97% 23.56 kg/m2       Blood Pressure from Last 3 " Encounters:   08/01/18 132/77   03/05/18 124/77   05/10/17 120/71    Weight from Last 3 Encounters:   08/01/18 148 lb 3.2 oz (67.2 kg)   03/05/18 152 lb 9.6 oz (69.2 kg)   05/10/17 153 lb (69.4 kg)              We Performed the Following     Basic Metabolic Profile ([facility])     EKG 12-lead complete w/read - Clinics     Magnesium ([facility])     Phosphorus ([facility])     Urinalysis, Micro If ([facility])     Urine Microscopic ([facility])        Primary Care Provider Office Phone # Fax #    Gloria Deshpande [phone]       [facility] [address] [place] [address]        Equal Access to Services     HANSA MUNOZ AH: Hadii michael clark hadasho Soomaali, waaxda luqadaha, qaybta kaalmada adeegyada, waxay cyrus hayjanice mirza . So [facility] [phone].    ATENCIÓN: Si habla español, tiene a maldonado disposición servicios gratuitos de asistencia lingüística. Llame al [phone].    We comply with applicable federal civil rights laws and [place] laws. We do not discriminate on the basis of race, color, national origin, age, disability, sex, sexual orientation, or gender identity.            Thank you!     Thank you for choosing [facility]  for your care. Our goal is always to provide you with excellent care. Hearing back from our patients is one way we can continue to improve our services. Please take a few minutes to complete the written survey that you may receive in the mail after your visit with us. Thank you!             Your Updated Medication List - Protect others around you: Learn how to safely use, store and throw away your medicines at www.disposemymeds.org.          This list is accurate as of 8/1/18  5:57 PM.  Always use your most recent med list.                   Brand Name Dispense Instructions for use Diagnosis    aspirin 81 MG chewable tablet      Take 162 mg by mouth daily

## 2018-08-02 NOTE — PROGRESS NOTES
"Blue team,   Please call with results- Pt's daughter Kelsey requested we call her with lab results: \"All the electrolytes were normal! Magnesium was 2.1 and phosphorus was 3.2. Potassium was 4.3, sodium and creatinine levels were normal (142 and 1.25 respectively).\"    We talked about the normal urine results in clinic already.     Thanks.   Norma"

## 2018-08-03 NOTE — PROGRESS NOTES
EKG final read by cardiology supports our dx: NSR with PACs, RBBB, 1st degree AV block, similar from prior

## 2018-08-07 ENCOUNTER — RECORDS - HEALTHEAST (OUTPATIENT)
Dept: ADMINISTRATIVE | Facility: OTHER | Age: 83
End: 2018-08-07

## 2018-08-07 NOTE — PATIENT INSTRUCTIONS
Referral for ( TEST )  :      Holter Monitor-24 hours  LOCATION/PLACE/Provider :    [facility]   DATE & TIME :     8- at 11:30  PHONE :     [phone]  FAX :     [phone]  Appointment made by clinic staff/:    Claudia

## 2018-08-14 ENCOUNTER — COMMUNICATION - HEALTHEAST (OUTPATIENT)
Dept: TELEHEALTH | Facility: CLINIC | Age: 83
End: 2018-08-14

## 2018-08-14 ENCOUNTER — HOSPITAL ENCOUNTER (OUTPATIENT)
Dept: CARDIOLOGY | Facility: HOSPITAL | Age: 83
Discharge: HOME | End: 2018-08-14
Attending: FAMILY MEDICINE

## 2018-08-14 DIAGNOSIS — I49.9 IRREGULAR HEART BEAT: ICD-10-CM

## 2018-08-20 ENCOUNTER — AMBULATORY - HEALTHEAST (OUTPATIENT)
Dept: CARDIOLOGY | Facility: CLINIC | Age: 83
End: 2018-08-20

## 2018-08-21 ENCOUNTER — RECORDS - HEALTHEAST (OUTPATIENT)
Dept: ADMINISTRATIVE | Facility: OTHER | Age: 83
End: 2018-08-21

## 2018-08-21 ENCOUNTER — OFFICE VISIT (OUTPATIENT)
Dept: FAMILY MEDICINE | Facility: CLINIC | Age: 83
End: 2018-08-21
Payer: COMMERCIAL

## 2018-08-21 VITALS
WEIGHT: 144.6 LBS | TEMPERATURE: 98.5 F | OXYGEN SATURATION: 95 % | RESPIRATION RATE: 16 BRPM | HEART RATE: 65 BPM | DIASTOLIC BLOOD PRESSURE: 73 MMHG | BODY MASS INDEX: 23.24 KG/M2 | HEIGHT: 66 IN | SYSTOLIC BLOOD PRESSURE: 125 MMHG

## 2018-08-21 DIAGNOSIS — R00.1 BRADYCARDIA: Primary | ICD-10-CM

## 2018-08-21 DIAGNOSIS — I49.3 FREQUENT PVCS: ICD-10-CM

## 2018-08-21 LAB — TSH SERPL DL<=0.05 MIU/L-ACNC: 4.94 UIU/ML (ref 0.3–5)

## 2018-08-21 NOTE — LETTER
August 22, 2018      Jerardo Paredes  [address]   [place] [address]        Dear Jerardo,  Your thyroid screen came back normal. Thank you    Flora Camacho, DO   Please see below for your test results.    Resulted Orders   TSH  Sensitive ([facility])   Result Value Ref Range    TSH 4.94 0.30 - 5.00 uIU/mL    Narrative    Test performed by:  [facility] [address] [place] [address]       If you have any questions, please call the clinic to make an appointment.    Sincerely,    Flora Camacho, DO

## 2018-08-21 NOTE — PATIENT INSTRUCTIONS
-Check thyroid function today  -Schedule echo for heart function  -Follow up with Cardiology on Thursday      Referral for ( TEST )  :      Echocardiogram  LOCATION/PLACE/Provider :    [facility]   DATE & TIME :     8- at 1:45  PHONE :     [phone]  FAX :     [phone]  Appointment made by clinic staff/:    Claudia

## 2018-08-21 NOTE — PROGRESS NOTES
Preceptor Attestation:   Patient seen, evaluated and discussed with the resident, Dr. Flora Camacho. I have verified the content of the note, which accurately reflects my assessment of the patient and the plan of care.  Supervising Physician:Brandee Saab MD  [facility]

## 2018-08-21 NOTE — MR AVS SNAPSHOT
After Visit Summary   8/21/2018    Jerardo Paredes    MRN: [id number]           Patient Information     Date Of Birth          4/3/1926        Visit Information        Provider Department      8/21/2018 9:20 AM Stewart, Haley, DO [facility]        Today's Diagnoses     Bradycardia    -  1    Frequent PVCs          Care Instructions    -Check thyroid function today  -Schedule echo for heart function  -Follow up with Cardiology on Thursday      Referral for ( TEST )  :      Echocardiogram  LOCATION/PLACE/Provider :    [facility]   DATE & TIME :     8- at 1:45  PHONE :     [phone]  FAX :     [phone]  Appointment made by clinic staff/:    Claudia            Follow-ups after your visit        Follow-up notes from your care team     Return in about 1 month (around 9/21/2018).      Future tests that were ordered for you today     Open Future Orders        Priority Expected Expires Ordered    Echocardiogram Complete Routine  9/4/2019 8/21/2018            Who to contact     Please call your clinic at [phone] to:    Ask questions about your health    Make or cancel appointments    Discuss your medicines    Learn about your test results    Speak to your doctor            Additional Information About Your Visit        MyChart Information     [employer] gives you secure access to your electronic health record. If you see a primary care provider, you can also send messages to your care team and make appointments. If you have questions, please call your primary care clinic.  If you do not have a primary care provider, please call [phone] and they will assist you.      [employer] is an electronic gateway that provides easy, online access to your medical records. With [employer], you can request a clinic appointment, read your test results, renew a prescription or communicate with your care team.     To access your existing account, please contact your [facility]  [facility] [facility] or call [phone] for assistance.        Care EveryWhere ID     This is your Care EveryWhere ID. This could be used by other organizations to access your [facility] medical records  [id number]        Your Vitals Were     Pulse Temperature Respirations Height Pulse Oximetry BMI (Body Mass Index)    65 98.5  F (36.9  C) (Oral) 16 5' 6\" (167.6 cm) 95% 23.34 kg/m2       Blood Pressure from Last 3 Encounters:   08/21/18 125/73   08/01/18 132/77   03/05/18 124/77    Weight from Last 3 Encounters:   08/21/18 144 lb 9.6 oz (65.6 kg)   08/01/18 148 lb 3.2 oz (67.2 kg)   03/05/18 152 lb 9.6 oz (69.2 kg)              We Performed the Following     TSH  Sensitive ([facility])        Primary Care Provider Office Phone # Fax #    Gloria Deshpande [phone]       [facility] [address] [place] [address]        Equal Access to Services     [facility]: Hadii aad ku hadasho Soomaali, waaxda luqadaha, qaybta kaalmada adeegyada, makenna mirza . So [facility] [phone].    ATENCIÓN: Si habla español, tiene a maldonado disposición servicios gratuitos de asistencia lingüística. [facility] [phone].    We comply with applicable federal civil rights laws and [place] laws. We do not discriminate on the basis of race, color, national origin, age, disability, sex, sexual orientation, or gender identity.            Thank you!     Thank you for choosing [facility]  for your care. Our goal is always to provide you with excellent care. Hearing back from our patients is one way we can continue to improve our services. Please take a few minutes to complete the written survey that you may receive in the mail after your visit with us. Thank you!             Your Updated Medication List - Protect others around you: Learn how to safely use, store and throw away your medicines at www.disposemymeds.org.          This list is accurate as of 8/21/18  " 3:46 PM.  Always use your most recent med list.                   Brand Name Dispense Instructions for use Diagnosis    aspirin 81 MG chewable tablet      Take 162 mg by mouth daily

## 2018-08-21 NOTE — PROGRESS NOTES
Assessment and Plan   1. Bradycardia  2. Frequent PVCs: Reviewed Holter monitor results which revealed: frequent PVCs accounting for 30% of all heart beats and short nonsustained VT, first degree AV block and RBBB. Given significant PVC burden will obtain echocardiogram to assess for PVC cardiomyopathy. Patient has a new appointment with Dr. Bello, Cardiology on Thursday 8/23/18 who should have the results from the echo at that time. Family unsure if they would proceed with pacemaker if that is recommended due to patient's age. Will also obtain TSH. He had normal electrolytes (including K, Mag, and Phos) at 8/1/18 visit.  - TSH  Sensitive ([facility])  - Echocardiogram Complete; Future    Follow up: 1 month to follow up on Cardiology visit  Options for treatment and follow-up care were reviewed with the patient and/or guardian. Jerardo Paredes and/or guardian engaged in the decision making process and verbalized understanding of the options discussed and agreed with the final plan.    Flora Camacho DO  [facility] Medicine Resident    Precepted with: Brandee Saab MD           HPI:   Jerardo Paredes is a 89 year old  male with PMH of CVA in 2013 and CKD Stage 3 who presents to discuss Holter monitoring results:    -still feeling tired  -no trouble wearing the holter monitoring, just a nuisance during sleep  -denies cp, palpitations, sob, dizziness/lightheadedness, no lower extremity edema  -no recent falls  -plans to meet with Dr. Mott, Cardiology on Thursday for second opinion           PMHX:     Patient Active Problem List   Diagnosis     Tingling in left arm     HL (hearing loss)     Weakness of left arm     ACP (advance care planning)     Cerebral infarction due to vascular occlusion (H)     Benign non-nodular prostatic hyperplasia with lower urinary tract symptoms     CKD (chronic kidney disease) stage 3, GFR 30-59 ml/min     Malnutrition of mild degree (H)     Osteoarthrosis     Current  "Outpatient Prescriptions   Medication Sig Dispense Refill     aspirin 81 MG chewable tablet Take 162 mg by mouth daily         Social History   Substance Use Topics     Smoking status: Former Smoker     Smokeless tobacco: Never Used      Comment: quit for a long time per pt.     Alcohol use 0.0 oz/week     0 Standard drinks or equivalent per week      Comment: Occasionally.      Allergies   Allergen Reactions     No Known Drug Allergy        No results found for this or any previous visit (from the past 24 hour(s)).         Review of Systems:   See HPI.          Physical Exam:     Vitals:    08/21/18 0925   BP: 125/73   Pulse: 65   Resp: 16   Temp: 98.5  F (36.9  C)   TempSrc: Oral   SpO2: 95%   Weight: 144 lb 9.6 oz (65.6 kg)   Height: 5' 6\" (167.6 cm)     Body mass index is 23.34 kg/(m^2).    General: Alert, well-appearing male in NAD  HEENT: PERRL, moist oral mucus membranes  Pulm: CTA BL, no tachypnea  CV: bradycardic, irregular rhythm  Ext: Warm, well perfused, no LE edema  Psych: Mood appropriate to visit content, full affect, rational thought content and process              "

## 2018-08-22 ENCOUNTER — HOSPITAL ENCOUNTER (OUTPATIENT)
Dept: CARDIOLOGY | Facility: CLINIC | Age: 83
Discharge: HOME | End: 2018-08-22
Attending: FAMILY MEDICINE

## 2018-08-22 DIAGNOSIS — I42.9 CARDIOMYOPATHY, UNSPECIFIED TYPE (H): ICD-10-CM

## 2018-08-22 LAB
AORTIC VALVE MEAN VELOCITY: 101.5 CM/S
AV DIMENSIONLESS INDEX VTI: 0.7
AV MEAN GRADIENT: 4.5 MMHG
AV PEAK GRADIENT: 7.8 MMHG
AV VALVE AREA: 2.3 CM2
AV VELOCITY RATIO: 0.8
BSA FOR ECHO PROCEDURE: 1.78 M2
CV BLOOD PRESSURE: NORMAL MMHG
CV ECHO HEIGHT: 66.5 IN
CV ECHO WEIGHT: 150 LBS
DOP CALC AO PEAK VEL: 140 CM/S
DOP CALC AO VTI: 26.7 CM
DOP CALC LVOT AREA: 3.14 CM2
DOP CALC LVOT DIAMETER: 2 CM
DOP CALC LVOT PEAK VEL: 105 CM/S
DOP CALC LVOT STROKE VOLUME: 60.3 CM3
DOP CALCLVOT PEAK VEL VTI: 19.2 CM
ECHO EJECTION FRACTION ESTIMATED: 55 %
LA AREA 1: 14.8 CM2
LEFT ATRIUM LENGTH: 4.81 CM
LEFT ATRIUM SIZE: 3.1 CM
LEFT VENTRICULAR OUTFLOW TRACT MEAN GRADIENT: 3 MMHG
LEFT VENTRICULAR OUTFLOW TRACT MEAN VELOCITY: 70.2 CM/S
LEFT VENTRICULAR OUTFLOW TRACT PEAK GRADIENT: 4 MMHG
LV STROKE VOLUME INDEX: 33.9 ML/M2
MITRAL VALVE E/A RATIO: 0.6
MV DECELERATION TIME: 322 MS
MV E'TISSUE VEL-LAT: 6.43 CM/S
MV LATERAL E/E' RATIO: 6.5
MV PEAK A VELOCITY: 67.6 CM/S
MV PEAK E VELOCITY: 42 CM/S
NUC REST DIASTOLIC VOLUME INDEX: 2400 LBS
NUC REST SYSTOLIC VOLUME INDEX: 66.5 IN
TRICUSPID VALVE ANULAR PLANE SYSTOLIC EXCURSION: 2.9 CM

## 2018-08-22 ASSESSMENT — MIFFLIN-ST. JEOR: SCORE: 1266.09

## 2018-08-22 NOTE — PROGRESS NOTES
Please notify patient that his thyroid screen came back normal.    Flora Camacho DO  [facility] Medicine Resident  Pager #[phone]

## 2018-08-23 ENCOUNTER — OFFICE VISIT - HEALTHEAST (OUTPATIENT)
Dept: CARDIOLOGY | Facility: CLINIC | Age: 83
End: 2018-08-23

## 2018-08-23 DIAGNOSIS — I49.3 VENTRICULAR ECTOPIC ACTIVITY: ICD-10-CM

## 2018-08-23 ASSESSMENT — MIFFLIN-ST. JEOR: SCORE: 1244.54

## 2018-08-24 DIAGNOSIS — R00.1 BRADYCARDIA: ICD-10-CM

## 2018-08-24 NOTE — PROGRESS NOTES
AGGIE Pederson as you'll be seeing pt in near future.    Reviewed holter results. These were discussed with pt at recent visit with Dr. Camacho and subsequently was referred to cardiology. Pt will be seeing Dr. Deshpande for follow up in September and can discuss sleep study per cards recommendations.

## 2018-09-24 ENCOUNTER — OFFICE VISIT (OUTPATIENT)
Dept: FAMILY MEDICINE | Facility: CLINIC | Age: 83
End: 2018-09-24
Payer: COMMERCIAL

## 2018-09-24 VITALS
OXYGEN SATURATION: 96 % | RESPIRATION RATE: 20 BRPM | TEMPERATURE: 97.2 F | DIASTOLIC BLOOD PRESSURE: 70 MMHG | HEART RATE: 72 BPM | SYSTOLIC BLOOD PRESSURE: 150 MMHG | WEIGHT: 147.2 LBS | BODY MASS INDEX: 23.66 KG/M2 | HEIGHT: 66 IN

## 2018-09-24 DIAGNOSIS — R53.83 FATIGUE, UNSPECIFIED TYPE: Primary | ICD-10-CM

## 2018-09-24 DIAGNOSIS — R26.81 GAIT INSTABILITY: ICD-10-CM

## 2018-09-24 LAB — HEMOGLOBIN: 14.4 G/DL (ref 13.3–17.7)

## 2018-09-24 NOTE — PATIENT INSTRUCTIONS
-Follow-up with cardiologist if dizziness recurs to discuss options for treatment.  -Drink enough fluids to prevent dizziness from dehydration  -Referral for sleep study, PT, and new walker   -Check blood count today for anemia for fatigue      Referral for ( TEST )  :      Sleep Eval & Management   LOCATION/PLACE/Provider :    [facility]  [address], [place] [address]  DATE & TIME :     11-6-2018 at 11:30  PHONE :     [phone]  FAX :     [phone]  Appointment made by clinic staff/:    Claudia      Referral for Home Physical Therapy along with face sheet, med list and OV note faxed to [facility]  [phone]  [phone]  Patient/Family will be contacted to schedule appointment.

## 2018-09-24 NOTE — PROGRESS NOTES
"       HPI:       Jerardo Paredes is a 89 year old  male who presents to address the following follow-up for cardiac issues including bradycardia and frequent PVCs, 1st degree AV block, and RBBB    Had a one-time episode of dizziness last week whichlasted for 30 seconds. He reports that he was standing when it occured and needed to sit because of the dizziness.He did not feel like he was going to pass out. He did not have CP, palpitations, or LOC at that time. His daughter reports that he does not take any medications.    He reports that his fatigue still persistent a stable.He reports No snoring that he knows of and his daughter could not share if he snores because he lives alone. ADL's are stable according to patient, able to feed himself and keep himself clean.    His daughter reports that he has had a cold this past week and had some coughing when resting as well as sneezing and \"weepy eyes\". No fevers or chills. No N/V.  No history of seasonal allergies. He got the cold symptoms after the episode of dizziness. He got his flu shot last week.             PMHX:     Patient Active Problem List   Diagnosis     Tingling in left arm     HL (hearing loss)     Weakness of left arm     ACP (advance care planning)     Cerebral infarction due to vascular occlusion (H)     Benign non-nodular prostatic hyperplasia with lower urinary tract symptoms     CKD (chronic kidney disease) stage 3, GFR 30-59 ml/min     Malnutrition of mild degree (H)     Osteoarthrosis     Frequent PVCs       Current Outpatient Prescriptions   Medication Sig Dispense Refill     aspirin 81 MG chewable tablet Take 162 mg by mouth daily            Allergies   Allergen Reactions     No Known Drug Allergy        No results found for this or any previous visit (from the past 24 hour(s)).    Current Outpatient Prescriptions   Medication     aspirin 81 MG chewable tablet     No current facility-administered medications for this visit.               Review of " "Systems:   ROS as described above.  Denies F/S/C/N/V/SOB/CP          Physical Exam:     Vitals:    09/24/18 1321   BP: 150/70   Pulse: 72   Resp: 20   Temp: 97.2  F (36.2  C)   TempSrc: Oral   SpO2: 96%   Weight: 147 lb 3.2 oz (66.8 kg)   Height: 5' 6\" (167.6 cm)     Body mass index is 23.76 kg/(m^2).    GEN: patient sitting comfortably in NAD  HEEN: Head is atraumatic, normocephalic, eyes anicteric, mucous membranes moist  CV: RRR w/o M/R/G  PULM: CTAB without w/r/r  ABD: soft, nontender, bowel sounds present  NEURO: Alert and oriented x3.  No focal motor abnormalities.  Face symmetric.  PSYCH: appropriate  SKIN: No rashes, bruising, or other lesions    Results for orders placed or performed in visit on 08/21/18   TSH  Sensitive ([facility])   Result Value Ref Range    TSH 4.94 0.30 - 5.00 uIU/mL    Narrative    Test performed by:  [facility]  [address] [place] [address]       Assessment and Plan   Jerardo Paredes is a 89 year old  male who presents to address the following follow-up for cardiac issues including bradycardia and frequent PVCs, 1st degree AV block, and RBB with recent one-time symptom of dizziness was standing not consistent with orthostatic hypotension (sitting to standing).     Dizziness likely secondary to cardiac arrhythmias:  -Encouraged the patient to stay hydrated  -F/u with cardiology for recent dizziness  -Recent Cardiology recs: repeat Echo and Holter monitor in 6 months.  -Most recent Echo in 8/22/18 showed EF 55% and borderline aortic stenosis.     Chronic persistent fatigue:  -Per cardiology: likely not secondary to cardiac abnls.  -Sleep study to evaluate for sleep apnea    Recent cold symptoms:  -Encouraged to drink enough fluids  -Supportive care    Balance issues:  -Referral for PT/OT and evaluation for new walker    Options for treatment and follow-up care were reviewed with the patient and/or guardian. Jerardo Paredes and/or guardian engaged in the decision " making process and verbalized understanding of the options discussed and agreed with the final plan.    Kimmie Pradhan, MS4   [employer]    Gloria Deshpande MD          In supervising the medical student, I saw and evaluated the patient with the student and personally performed all aspects of the history and physical.  I have reviewed and verified that the documentation is correct and complete.      Gloria Deshpande MD

## 2018-09-24 NOTE — MR AVS SNAPSHOT
After Visit Summary   9/24/2018    Jerardo Praedes    MRN: [id number]           Patient Information     Date Of Birth          4/3/1926        Visit Information        Provider Department      9/24/2018 1:20 PM Gloria Deshpande MD [facility]        Today's Diagnoses     Fatigue, unspecified type    -  1    Gait instability          Care Instructions    -Follow-up with cardiologist if dizziness recurs to discuss options for treatment.  -Drink enough fluids to prevent dizziness from dehydration  -Referral for sleep study, PT, and new walker   -Check blood count today for anemia for fatigue      Referral for ( TEST )  :      Sleep Eval & Management   LOCATION/PLACE/Provider :     Sleep Center  [address] [address]  DATE & TIME :     11-6-2018 at 11:30  PHONE :     [phone]  FAX :     [phone]  Appointment made by clinic staff/:    Claudia            Follow-ups after your visit        Additional Services     PHYSICAL THERAPY REFERRAL       PT/OT REFERRAL  Jerardo Paredes  4/3/1926  Phone #: [phone] (home)    needed: No  Language: English    Up to 12 sessions    Please make reccomendation about need for assistive walking device and what type would be needed.     Evaluate and treat    Patient preference: [facility].  See sheet            SLEEP EVALUATION & MANAGEMENT REFERRAL - ADULT -Other (Respond in commments) (health [facility])       Please be aware that coverage of these services is subject to the terms and limitations of your health insurance plan.  Call member services at your health plan with any benefit or coverage questions.      Please bring the following to your appointment:    >>   List of current medications   >>   This referral request   >>   Any documents/labs given to you for this referral                      Who to contact     Please call your clinic at [phone] to:    Ask questions about your  "health    Make or cancel appointments    Discuss your medicines    Learn about your test results    Speak to your doctor            Additional Information About Your Visit        [facility] Information     [employer] gives you secure access to your electronic health record. If you see a primary care provider, you can also send messages to your care team and make appointments. If you have questions, please call your primary care clinic.  If you do not have a primary care provider, please call [phone] and they will assist you.      [employer] is an electronic gateway that provides easy, online access to your medical records. With [employer], you can request a clinic appointment, read your test results, renew a prescription or communicate with your care team.     To access your existing account, please contact your [facility] or call [phone] for assistance.        Care EveryWhere ID     This is your Care EveryWhere ID. This could be used by other organizations to access your [facility] records  [id number]        Your Vitals Were     Pulse Temperature Respirations Height Pulse Oximetry BMI (Body Mass Index)    72 97.2  F (36.2  C) (Oral) 20 5' 6\" (167.6 cm) 96% 23.76 kg/m2       Blood Pressure from Last 3 Encounters:   09/24/18 150/70   08/21/18 125/73   08/01/18 132/77    Weight from Last 3 Encounters:   09/24/18 147 lb 3.2 oz (66.8 kg)   08/21/18 144 lb 9.6 oz (65.6 kg)   08/01/18 148 lb 3.2 oz (67.2 kg)              We Performed the Following     Hemoglobin (HGB) ([facility])        Primary Care Provider Office Phone # Fax #    Gloria Deshpande [phone]       [facility] [address] [address]        Equal Access to Services     HANSA MUNOZ : Jac Brandon, halina moreno, obie elder, makenna baires. So [facility] [phone].    ATENCIÓN: Si habla español, tiene a maldonado disposición " servicios gratuitos de asistencia lingüística. Theresa guerra [phone].    We comply with applicable federal civil rights laws and [place] laws. We do not discriminate on the basis of race, color, national origin, age, disability, sex, sexual orientation, or gender identity.            Thank you!     Thank you for choosing [facility]  for your care. Our goal is always to provide you with excellent care. Hearing back from our patients is one way we can continue to improve our services. Please take a few minutes to complete the written survey that you may receive in the mail after your visit with us. Thank you!             Your Updated Medication List - Protect others around you: Learn how to safely use, store and throw away your medicines at www.disposemymeds.org.          This list is accurate as of 9/24/18 11:59 PM.  Always use your most recent med list.                   Brand Name Dispense Instructions for use Diagnosis    aspirin 81 MG chewable tablet      Take 162 mg by mouth daily

## 2018-10-03 ENCOUNTER — AMBULATORY - HEALTHEAST (OUTPATIENT)
Dept: SLEEP MEDICINE | Facility: CLINIC | Age: 83
End: 2018-10-03

## 2018-10-03 DIAGNOSIS — R53.83 FATIGUE: ICD-10-CM

## 2018-10-23 ENCOUNTER — MEDICAL CORRESPONDENCE (OUTPATIENT)
Dept: HEALTH INFORMATION MANAGEMENT | Facility: CLINIC | Age: 83
End: 2018-10-23

## 2018-10-29 ENCOUNTER — MEDICAL CORRESPONDENCE (OUTPATIENT)
Dept: HEALTH INFORMATION MANAGEMENT | Facility: CLINIC | Age: 83
End: 2018-10-29

## 2018-11-06 ENCOUNTER — OFFICE VISIT - HEALTHEAST (OUTPATIENT)
Dept: SLEEP MEDICINE | Facility: CLINIC | Age: 83
End: 2018-11-06

## 2018-11-06 ENCOUNTER — AMBULATORY - HEALTHEAST (OUTPATIENT)
Dept: SLEEP MEDICINE | Facility: CLINIC | Age: 83
End: 2018-11-06

## 2018-11-06 DIAGNOSIS — G47.8 SLEEP DYSFUNCTION WITH SLEEP STAGE DISTURBANCE: ICD-10-CM

## 2018-11-06 DIAGNOSIS — I49.3 PREMATURE VENTRICULAR BEAT: ICD-10-CM

## 2018-11-06 DIAGNOSIS — G47.10 HYPERSOMNIA: ICD-10-CM

## 2018-11-06 ASSESSMENT — MIFFLIN-ST. JEOR: SCORE: 1240

## 2018-11-10 ENCOUNTER — TRANSFERRED RECORDS (OUTPATIENT)
Dept: HEALTH INFORMATION MANAGEMENT | Facility: CLINIC | Age: 83
End: 2018-11-10

## 2018-11-12 ENCOUNTER — TELEPHONE (OUTPATIENT)
Dept: FAMILY MEDICINE | Facility: CLINIC | Age: 83
End: 2018-11-12

## 2018-11-12 NOTE — TELEPHONE ENCOUNTER
Called and provided verbal orders to extend PT for once a week for three weeks. Will route to PCP for review. AMIRAH Santizo

## 2018-11-14 ENCOUNTER — COMMUNICATION - HEALTHEAST (OUTPATIENT)
Dept: SLEEP MEDICINE | Facility: CLINIC | Age: 83
End: 2018-11-14

## 2018-11-14 DIAGNOSIS — G47.34 SLEEP RELATED HYPOXIA: ICD-10-CM

## 2018-11-14 DIAGNOSIS — G47.10 HYPERSOMNIA: ICD-10-CM

## 2018-11-19 ENCOUNTER — MEDICAL CORRESPONDENCE (OUTPATIENT)
Dept: HEALTH INFORMATION MANAGEMENT | Facility: CLINIC | Age: 83
End: 2018-11-19

## 2018-11-20 ENCOUNTER — MEDICAL CORRESPONDENCE (OUTPATIENT)
Dept: HEALTH INFORMATION MANAGEMENT | Facility: CLINIC | Age: 83
End: 2018-11-20

## 2018-11-29 ENCOUNTER — TELEPHONE (OUTPATIENT)
Dept: FAMILY MEDICINE | Facility: CLINIC | Age: 83
End: 2018-11-29

## 2018-11-29 NOTE — TELEPHONE ENCOUNTER
We have not obtained results r/t not being the ordering provider for the device. Called Cholo to update, Cholo stated she will try to find out who obtains the results and how to get them to the patient. Sukhdev MACARIO

## 2018-11-29 NOTE — TELEPHONE ENCOUNTER
[facility] phone call message- general phone call:    Reason for call: Wants to leave a message for Dr. Deshpande to call her in regards to a couple of questions for his upcoming appt that he has with her. Please call and advise.     Return call needed: Yes    OK to leave a message on voice mail?     Primary language: English      needed? No    Call taken on November 29, 2018 at 1:10 PM by Stevan Kothari

## 2018-11-29 NOTE — TELEPHONE ENCOUNTER
Spoke with Jerardo' daughter Attila who states she wanted to discuss with Dr Deshpande ahead of time prior to clinic appt 12/11/2018 in preparation- their concerns regarding Jerardo.  Jerardo lives in a senior resident, staff had stressed concerns of Jerardo not following rules at the residence. Jerardo states he is forgetful and does not remember their policy. They are uncertain if it's his memory or if he is just being belligerent per Attila. Daughters feel Dr Deshpande and Jerardo have a good rapport and he would listen to Dr Deshpande, they would like for Dr Deshpande to lead the conversation for concerns above. Both daughters will be present for clinic appt 12/11/2018. Hygiene has not been so good, does not change his Depends or clothes. Causing odor and becomes an issue of other residents complaining and unable to sit by Jerardo. Alice AMCARIO

## 2018-12-03 ENCOUNTER — COMMUNICATION - HEALTHEAST (OUTPATIENT)
Dept: SLEEP MEDICINE | Facility: CLINIC | Age: 83
End: 2018-12-03

## 2018-12-03 ENCOUNTER — NURSE TRIAGE (OUTPATIENT)
Dept: NURSING | Facility: CLINIC | Age: 83
End: 2018-12-03

## 2018-12-03 NOTE — TELEPHONE ENCOUNTER
Dtr Kelsey calling, had spoken to clinic 11/29/18 regarding sleep study results.  Sleep study done somewhere between 11/9/18 and 11/12/18 at home.  Sleep study equipment was received per [employer] but ordering sleep medicine provider was [facility] provider, Dr Morrison.  [facility] does have results, and did verbally give Kelsey the basic interpretation (not the whole report) of Karri having low pulse which resulted in desatting to 84% but nothing more specific.  Has OV with PCP next week, dtr would like the report.  In [facility], no results noted thus far.  Advised unable to further assist, results not yet received.  Dtr will attempt to contact [facility] for report.  Offered to send request to clinic staff to further assist, this nurse unable to assist as not in clinic.       Additional Information    [1] Follow-up call to recent contact AND [2] information only call, no triage required    Protocols used: INFORMATION ONLY CALL-ADULT-

## 2018-12-11 ENCOUNTER — MEDICAL CORRESPONDENCE (OUTPATIENT)
Dept: HEALTH INFORMATION MANAGEMENT | Facility: CLINIC | Age: 83
End: 2018-12-11

## 2018-12-11 ENCOUNTER — TELEPHONE (OUTPATIENT)
Dept: FAMILY MEDICINE | Facility: CLINIC | Age: 83
End: 2018-12-11

## 2018-12-11 ENCOUNTER — OFFICE VISIT (OUTPATIENT)
Dept: FAMILY MEDICINE | Facility: CLINIC | Age: 83
End: 2018-12-11
Payer: COMMERCIAL

## 2018-12-11 VITALS
SYSTOLIC BLOOD PRESSURE: 114 MMHG | WEIGHT: 147 LBS | RESPIRATION RATE: 24 BRPM | DIASTOLIC BLOOD PRESSURE: 73 MMHG | BODY MASS INDEX: 23.63 KG/M2 | OXYGEN SATURATION: 97 % | TEMPERATURE: 97.4 F | HEART RATE: 81 BPM | HEIGHT: 66 IN

## 2018-12-11 DIAGNOSIS — I63.529: Primary | ICD-10-CM

## 2018-12-11 DIAGNOSIS — R79.81 ABNORMAL PULSE OXIMETRY: ICD-10-CM

## 2018-12-11 ASSESSMENT — MIFFLIN-ST. JEOR: SCORE: 1259.54

## 2018-12-11 NOTE — PROGRESS NOTES
HPI:       Jerardo Paredes is a 89 year old  male who presents to address the following concerns:    1) Abnormal sleep oximetry:  -patient with abnormal sleep oximetry this fall through [place] [facility]  -evaluation was initiated by family for concerns of fatigue and daytime somnolence  -desats to 84% associated with HR changes overnight  -recommendation was made for in-facility sleep study for further evaluation  -patient does not have significant opinion regarding this  -daughters present today and have significant reservations about the sleep study  -patient would not want intervention if one was neded CPAP, Pacer, O2     2) Issues with living facility  -contacted by patient daughters regarding issues at living facility (senior living + [facility])  -issues have arisen with staff and patient with increasing tensions related to use of a covered tea cup in apartment, not flushing after using toilet, grooming  -family has paid for additional services to help with grooming and washing  -daughters with concern that small issues will lead to non-renewal of the lease and necessitate move to assisted living which Mr Paredes would have difficulty affording         PMHX:     Patient Active Problem List   Diagnosis     Tingling in left arm     HL (hearing loss)     Weakness of left arm     ACP (advance care planning)     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    Current Outpatient Medications   Medication      "aspirin 81 MG chewable tablet     No current facility-administered medications for this visit.               Review of Systems:   ROS as described above.  Denies F/S/C/N/V/SOB/CP          Physical Exam:     Vitals:    12/11/18 0932   BP: 114/73   Pulse: 81   Resp: 24   Temp: 97.4  F (36.3  C)   TempSrc: Oral   SpO2: 97%   Weight: 66.7 kg (147 lb)   Height: 1.676 m (5' 6\")     Body mass index is 23.73 kg/m .    GEN: patient sitting comfortably in NAD  HEEN: Head is atraumatic, normocephalic, eyes anicteric, mucous membranes moist  CV: RRR w/o M/R/G  PULM: CTAB without w/r/r  ABD: soft, nontender, bowel sounds present  NEURO: Alert and oriented x3.  No focal motor abnormalities.  Face symmetric.  PSYCH: appropriate  SKIN: No rashes, bruising, or other lesions    Results for orders placed or performed in visit on 09/24/18   Hemoglobin (HGB) (LabDAQ)   Result Value Ref Range    Hemoglobin 14.4 13.3 - 17.7 g/dL       Assessment and Plan     1) Abnomral sleep oximetry:  -after long discussion of results and goals we have decided to not pursue formal sleep study  -have called and notified sleep clinic    2) Fatigue:  -disucssed energy expectations for individuals in their 90's  -discussed that daytime napping in common   -family is comfortable with this explanation    3) issues with residence:  -extensive discussion with patient about cost/benefit of complying with residence requests.  Validated frustration regar  -high value: being able to stay in current residence  -will work on toilet flusing and use of covered cup  -    Options for treatment and follow-up care were reviewed with the patient and/or guardian. Jerardo Paredes and/or guardian engaged in the decision making process and verbalized understanding of the options discussed and agreed with the final plan.    Gloria Deshpande MD            "

## 2018-12-11 NOTE — TELEPHONE ENCOUNTER
Called [facility] to let Dr. Huff know that the family decided not to pursue the sleep study anymore.       Mindy Wagoner CMA

## 2019-02-12 ENCOUNTER — TELEPHONE (OUTPATIENT)
Dept: FAMILY MEDICINE | Facility: CLINIC | Age: 84
End: 2019-02-12

## 2019-02-12 NOTE — TELEPHONE ENCOUNTER
[facility] phone call message- general phone call:    Reason for call: Patient daughter Attila called stating she wants to speak with Dr. Deshpande regarding her father. She states Jerardo will be moving from an independent home to an assisted living facility. She states she wants a medical form filled out that states the patients needs will be better met at an assisted living facility. She states she has questions about a new post form. Please call and advise.     Return call needed: Yes    OK to leave a message on voice mail? Yes    Primary language: English      needed? No    Call taken on February 12, 2019 at 4:23 PM by Monica Sheldon

## 2019-02-14 NOTE — TELEPHONE ENCOUNTER
Called patients daughter and spoke to darnell directly, Darnell asked for Dr Deshpande to call these numbers if she called at certain times as she is packing for the move.  Darnell Cell phone #[phone] only if before 1pm, then after 2pm please call work #[phone] ask for Darnell

## 2019-02-15 NOTE — TELEPHONE ENCOUNTER
Called to discuss Karri with daughter Attila.      Dad is moving into assisted living as of March 1st.      Found new facility called [facility] in [place].  Has signed papers     Need a POLST and medical form stating that he is stable for assisted living.

## 2019-02-21 ENCOUNTER — TELEPHONE (OUTPATIENT)
Dept: FAMILY MEDICINE | Facility: CLINIC | Age: 84
End: 2019-02-21

## 2019-02-21 NOTE — TELEPHONE ENCOUNTER
[facility] phone call message- general phone call:    Reason for call: Daughter states she is returning a call she received today, not sure who called her, they didn't leave a voicemail. Please call and advise.     Return call needed: Yes    OK to leave a message on voice mail?     Primary language: English      needed? No    Call taken on February 21, 2019 at 12:39 PM by Stevan Kothari  
Called informed Kelsey that things are taking care already with her sister Attila for her father (Jerardo). Just need to verify phone # due to unable to reach Attila with # on file.   
I will SWITCH the dose or number of times a day I take the medications listed below when I get home from the hospital:    furosemide 40 mg oral tablet  -- 1 tab(s) by mouth once a day    metoprolol tartrate 100 mg oral tablet  -- 1 tab(s) by mouth once a day

## 2019-03-04 ENCOUNTER — DOCUMENTATION ONLY (OUTPATIENT)
Dept: OTHER | Facility: CLINIC | Age: 84
End: 2019-03-04

## 2019-03-04 ENCOUNTER — AMBULATORY - HEALTHEAST (OUTPATIENT)
Dept: OTHER | Facility: CLINIC | Age: 84
End: 2019-03-04

## 2019-03-05 ENCOUNTER — MEDICAL CORRESPONDENCE (OUTPATIENT)
Dept: HEALTH INFORMATION MANAGEMENT | Facility: CLINIC | Age: 84
End: 2019-03-05

## 2019-03-15 ENCOUNTER — COMMUNICATION - HEALTHEAST (OUTPATIENT)
Dept: TELEHEALTH | Facility: CLINIC | Age: 84
End: 2019-03-15

## 2019-04-02 ENCOUNTER — COMMUNICATION - HEALTHEAST (OUTPATIENT)
Dept: ADMINISTRATIVE | Facility: CLINIC | Age: 84
End: 2019-04-02

## 2019-09-17 ENCOUNTER — TELEPHONE (OUTPATIENT)
Dept: FAMILY MEDICINE | Facility: CLINIC | Age: 84
End: 2019-09-17

## 2019-09-17 NOTE — TELEPHONE ENCOUNTER
[facility] phone call message- general phone call:    Reason for call: Caller would like to discuss patients health with PCP. Caller state she is unsure if she should speak to PCP or a previous doctor that patient had saw. Caller state until noon today she can be reached at [phone] otherwise after 2pm today and tomorrow she can be reach at her work # [phone].    Return call needed: Yes    OK to leave a message on voice mail? Yes    Primary language: English      needed? No    Call taken on September 17, 2019 at 8:54 AM by Bony Camacho

## 2019-09-20 NOTE — TELEPHONE ENCOUNTER
Called number listed.  Obtained voicemail.  Recommend visit for evaluation.  Do not recommend testosterone.  Cannot currently make recommendation about cariology without seeing patient.

## 2019-09-26 ENCOUNTER — OFFICE VISIT (OUTPATIENT)
Dept: FAMILY MEDICINE | Facility: CLINIC | Age: 84
End: 2019-09-26
Payer: COMMERCIAL

## 2019-09-26 VITALS
DIASTOLIC BLOOD PRESSURE: 79 MMHG | WEIGHT: 152.2 LBS | HEART RATE: 85 BPM | RESPIRATION RATE: 22 BRPM | BODY MASS INDEX: 24.46 KG/M2 | HEIGHT: 66 IN | SYSTOLIC BLOOD PRESSURE: 123 MMHG | OXYGEN SATURATION: 95 %

## 2019-09-26 DIAGNOSIS — R53.83 FATIGUE, UNSPECIFIED TYPE: ICD-10-CM

## 2019-09-26 DIAGNOSIS — R05.9 COUGH: Primary | ICD-10-CM

## 2019-09-26 LAB
ALBUMIN SERPL-MCNC: 3.7 G/DL (ref 3.2–4.5)
ALP SERPL-CCNC: 59 U/L (ref 40–150)
ALT SERPL-CCNC: 19 U/L (ref 0–45)
AST SERPL-CCNC: 21 U/L (ref 0–55)
BILIRUB SERPL-MCNC: 0.8 MG/DL (ref 0.2–1.3)
BUN SERPL-MCNC: 20 MG/DL (ref 7–30)
CALCIUM SERPL-MCNC: 9.6 MG/DL (ref 8.5–10.4)
CHLORIDE SERPLBLD-SCNC: 108 MMOL/L (ref 94–109)
CO2 SERPL-SCNC: 28 MMOL/L (ref 20–32)
CREAT SERPL-MCNC: 1.5 MG/DL (ref 0.8–1.5)
EGFR CALCULATED (BLACK REFERENCE): 56.2 ML/MIN
EGFR CALCULATED (NON BLACK REFERENCE): 46.4 ML/MIN
GLUCOSE SERPL-MCNC: 105 MG/DL (ref 60–109)
HCT VFR BLD AUTO: 46.7 % (ref 40–53)
HEMOGLOBIN: 14 G/DL (ref 13.3–17.7)
MCH RBC QN AUTO: 29.7 PG (ref 26.5–35)
MCHC RBC AUTO-ENTMCNC: 30 G/DL (ref 32–36)
MCV RBC AUTO: 99 FL (ref 78–100)
PLATELET # BLD AUTO: 199 K/UL (ref 150–450)
POTASSIUM SERPL-SCNC: 4 MMOL/L (ref 3.4–5.3)
PROT SERPL-MCNC: 7.9 G/DL (ref 6.6–8.8)
RBC # BLD AUTO: 4.72 M/UL (ref 4.4–5.9)
SODIUM SERPL-SCNC: 152 MMOL/L (ref 133–144)
TSH SERPL DL<=0.05 MIU/L-ACNC: 3.67 UIU/ML (ref 0.3–5)
WBC # BLD AUTO: 6 K/UL (ref 4–11)

## 2019-09-26 ASSESSMENT — MIFFLIN-ST. JEOR: SCORE: 1282.09

## 2019-09-26 NOTE — PROGRESS NOTES
Preceptor Attestation:   Patient seen, evaluated and discussed with the resident. I have verified the content of the note, which accurately reflects my assessment of the patient and the plan of care.  Supervising Physician:Flor Ch MD  [facility]

## 2019-09-26 NOTE — PROGRESS NOTES
Assessment and Plan   1. Cough  No focal symptoms - vitals stable. Discussed that CXR wasn't necessary today but if they preferred we would be happy to do it. Daughter wanted CXR for reassurance, personally reviewed and no focal PNA or other acute findings. Formal radiology read pending. No symptoms suggestive of flu. Reassurance given and RTC precautions given.  - XR CHEST 2 VW    2. Fatigue, unspecified type  Reviewed past note from Dr. Deshpande, they worked up fatigue for cardiac cause (Cardiology found him to have ectopy but is asymptomatic with EF of 55% at last check) and ROXY (overnight desatted to 84%) but family did not want to pursue further workup for either such as pacemaker or CPAP, etc which is appropriate given age and otherwise excellent condition for 89 y/o. Reviewed labs from past years, hgb and TSH normal ~1 year ago. Discussed with the daughter that the differential is broad for fatigue at his age and reiterated as Dr. Deshpande had as well that at 89 y/o this is common/expected and could be exacerbated by a viral URI, etc which could be causing the above. We discussed CBC, CMP, and TSH and that they were likely to be normal - daughter would like to pursue them anyway so that at their next visit they would have results in hand. Daughter also wondering about Vit D, B12, and adrenal glands as possible causes - I discussed that Vit D is likely to be low and not the cause of fatigue, B12 could be pursued if CBC abnormal, and that without hypotension or K issues adrenals were likely fine. Daughter at least wanted Vit D drawn today.  - XR CHEST 2 VW  - CBC with Plt (P )  - Comprehensive Metabolic Panel ([facility]) - results <1hr Protocol  - TSH  Sensitive ([facility])  - Vitamin D 25-Hydroxy ([facility])    Follow up with Dr. Deshpande as scheduled.    Options for treatment and follow-up care were reviewed with the patient and/or guardian. Jerardo Paredes and/or guardian engaged in the decision making process  "and verbalized understanding of the options discussed and agreed with the final plan.    Kevin Cartwright MD  [facility], PGY-3    Precepted patient with Dr. Flor Ch       HPI:   Jerardo Paredes is a 89 year old male who presents to clinic today for   Chief Complaint   Patient presents with     URI     Pt complains of cough, congestion, noises from lungs x 3 days     Medication Reconciliation     Completed     The nurse at his assisted living thought he had diminished lung sounds, worried about PNA. Per the facility he did not have fever. His facility does 3 meals/day and helps with some cleaning/ADLs.    Patient complains of fatigue and having \"no energy\", sleeping a lot more and missed some meals which is unusual for him (usually the first one there). He complains of dry cough, worse when he lays down - he sleeps with 1 pillow. He denies fevers or chills. No burning or pain with urination. No chest pain, but he does feel SOB. A few residents have been getting sick. He has gotten his flu shot 1 week ago - no body aches.         Review of Systems:     Constitutional, HEENT, cardiovascular, pulmonary, GI, musculoskeletal, neuro, skin, and psych systems are negative, except as otherwise noted.          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    Current Medications  No current outpatient medications on file.     Medication list reviewed and updated.    Social History  Social History     Tobacco Use     " "Smoking status: Former Smoker     Smokeless tobacco: Never Used     Tobacco comment: quit for a long time per pt.   Substance Use Topics     Alcohol use: Yes     Alcohol/week: 0.0 standard drinks     Comment: Occasionally.      Drug use: No     History   Drug Use No     Family History  Family History   Problem Relation Age of Onset     Cerebrovascular Disease Mother      Coronary Artery Disease Mother      Diabetes No family hx of      Hypertension No family hx of      Breast Cancer No family hx of      Cancer - colorectal No family hx of      Ovarian Cancer No family hx of      Prostate Cancer No family hx of      Other Cancer No family hx of      Asthma No family hx of      Hyperlipidemia No family hx of      Colon Cancer No family hx of      Depression No family hx of      Anxiety Disorder No family hx of      Mental Illness No family hx of      Substance Abuse No family hx of      Anesthesia Reaction No family hx of      Osteoporosis No family hx of      Genetic Disorder No family hx of      Thyroid Disease No family hx of      Obesity No family hx of      Allergies  Allergies   Allergen Reactions     No Known Drug Allergy           Physical Exam:     Vitals:    09/26/19 1100   BP: 123/79   Pulse: 85   Resp: 22   SpO2: 95%   Weight: 69 kg (152 lb 3.2 oz)   Height: 1.683 m (5' 6.25\")     Body mass index is 24.38 kg/m .    GENERAL APPEARANCE: alert, appears stated age, no acute distress  HEENT: Eyes grossly normal to inspection, nares normal, and mouth and throat without erythema, ulcers, or lesions  RESP: lungs clear to auscultation - no rales, rhonchi, or wheezes  CV: regular rate and rhythm, no murmur, click, rub, or gallop  ABDOMEN: soft, nontender   MSK: extremities normal, no gross deformities noted, no lower extremity edema  SKIN: no suspicious lesions or rashes   NEURO: Normal strength and tone, sensory exam grossly normal, mentation appears intact and speech normal  PSYCH: mood and affect normal/bright  "

## 2019-09-27 ENCOUNTER — TELEPHONE (OUTPATIENT)
Dept: FAMILY MEDICINE | Facility: CLINIC | Age: 84
End: 2019-09-27

## 2019-09-27 DIAGNOSIS — J18.9 PNEUMONIA OF LEFT LOWER LOBE DUE TO INFECTIOUS ORGANISM: Primary | ICD-10-CM

## 2019-09-27 LAB — 25(OH)D3 SERPL-MCNC: 13.3 NG/ML (ref 30–80)

## 2019-09-27 RX ORDER — LEVOFLOXACIN 750 MG/1
750 TABLET, FILM COATED ORAL DAILY
Qty: 5 TABLET | Refills: 0 | Status: SHIPPED | OUTPATIENT
Start: 2019-09-27 | End: 2020-02-27

## 2019-09-27 NOTE — TELEPHONE ENCOUNTER
Attempted to call patient's daughters, Attila Carreon and Scarlett Smith, who help organize patient's care.  Unsure which daughter was present during yesterday's appoint, so called both and left messages about antibiotics being at [facility] in [facility].  Gave Precautions.  Instructed to call clinic house line if further questions.  Sent in Levofloxacin 750mg daily x 5 days (CrCl >30, short course, normal QTc from past EKG).  This agent was chosen considering patient's age and kidney disease.  Instructed family members to bring patient in clinic on Monday.    Luis Armando Kumar MD PGY2

## 2019-09-27 NOTE — RESULT ENCOUNTER NOTE
Chay discussed most results - Vit D was pending. Patient is to have follow up on Monday to recheck Na. Will discuss Vit D then.   MICHELE

## 2019-09-27 NOTE — RESULT ENCOUNTER NOTE
Called and discussed results with the patient's daughter Attila - noted normal TSH, CBC, and CMP except for elevated Na at 152. Patient with no history of hypernatremia, most likely due to poor intake given that he has not been feeling well. Could be contributing to fatigue. Discussed my recommendation to encourage oral hydration with water and that we should recheck his Na early next week. Daughter understanding of plan and will call clinic in the morning.    Plan discussed with Dr. Owens.

## 2019-09-27 NOTE — RESULT ENCOUNTER NOTE
Left a  for Attila letting her know that I looked ahead at our clinic schedule and Dr. Deshpande was going to be precepting Monday AM and residents had open slots so I recommended that again they encourage PO fluid intake over the weekend (preferably water) and then follow up in clinic on Monday morning to recheck his Na. Also let them know that Vit D was still in process.    Kevin Cartwright MD

## 2019-09-30 ENCOUNTER — OFFICE VISIT (OUTPATIENT)
Dept: FAMILY MEDICINE | Facility: CLINIC | Age: 84
End: 2019-09-30
Payer: COMMERCIAL

## 2019-09-30 VITALS
SYSTOLIC BLOOD PRESSURE: 110 MMHG | WEIGHT: 150.8 LBS | OXYGEN SATURATION: 95 % | RESPIRATION RATE: 18 BRPM | BODY MASS INDEX: 24.16 KG/M2 | HEART RATE: 76 BPM | DIASTOLIC BLOOD PRESSURE: 76 MMHG

## 2019-09-30 DIAGNOSIS — J18.9 PNEUMONIA OF LEFT LOWER LOBE DUE TO INFECTIOUS ORGANISM: ICD-10-CM

## 2019-09-30 DIAGNOSIS — R79.89 LOW VITAMIN D LEVEL: ICD-10-CM

## 2019-09-30 DIAGNOSIS — E87.0 HYPERNATREMIA: Primary | ICD-10-CM

## 2019-09-30 LAB
BUN SERPL-MCNC: 22 MG/DL (ref 7–30)
CALCIUM SERPL-MCNC: 9.3 MG/DL (ref 8.5–10.4)
CHLORIDE SERPLBLD-SCNC: 105 MMOL/L (ref 94–109)
CO2 SERPL-SCNC: 28 MMOL/L (ref 20–32)
CREAT SERPL-MCNC: 1.2 MG/DL (ref 0.8–1.5)
EGFR CALCULATED (BLACK REFERENCE): 72.7 ML/MIN
EGFR CALCULATED (NON BLACK REFERENCE): 60.1 ML/MIN
GLUCOSE SERPL-MCNC: 101 MG/DL (ref 60–109)
POTASSIUM SERPL-SCNC: 3.9 MMOL/L (ref 3.4–5.3)
SODIUM SERPL-SCNC: 141 MMOL/L (ref 133–144)

## 2019-09-30 RX ORDER — SWAB
1 SWAB, NON-MEDICATED MISCELLANEOUS DAILY
Qty: 90 CAPSULE | Refills: 3 | Status: SHIPPED | OUTPATIENT
Start: 2019-09-30 | End: 2020-02-27

## 2019-09-30 NOTE — PROGRESS NOTES
Assessment and Plan     1. Hypernatremia  Improved - Likely from dehydration. Na 141 today from 152 on 9/26. Encouraged pushing fluids at assisted living.   - Basic Metabolic Panel ([facility]) - Results < 1 hr    2. Low vitamin D level  - vitamin D3 (CHOLECALCIFEROL) 400 units capsule; Take 1 capsule (400 Units) by mouth daily  Dispense: 90 capsule; Refill: 3    3. Pneumonia of left lower lobe due to infectious organism (H)  Was read as negative in clinic - Radiology called PNA the following day. Was started on Levaquin 750mg x5d. Today is d2 of treatment. Saturating well on RA, no fever. No side effects from medication. Will continue outpatient management.     Follow up in 1-2 weeks for recheck  Options for treatment and follow-up care were reviewed with the patient and/or guardian. Jerardo Paredes and/or guardian engaged in the decision making process and verbalized understanding of the options discussed and agreed with the final plan.    Sae Dubois MD   [facility] Residency  Pager #: [phone]    Precepted today with: Dr. Deshpande           HPI:       Jerardo Paredes is a 89 year old male who was recently seen by Dr. Cartwright presenting for follow up.     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)     Benign non-nodular prostatic hyperplasia with lower urinary tract symptoms     CKD (chronic kidney disease) stage 3, GFR 30-59 ml/min (H)     Malnutrition of mild degree (H)     Osteoarthrosis     Frequent PVCs     Abnormal sleep oximetry       Current Outpatient Medications   Medication Sig  Dispense Refill     levofloxacin (LEVAQUIN) 750 MG tablet Take 1 tablet (750 mg) by mouth daily for 5 days 5 tablet 0       Social History     Socioeconomic History     Marital status: Single     Spouse name: Not on file     Number of children: Not on file     Years of education: Not on file     Highest education level: Not on file   Occupational History     Not on file   Social Needs     Financial resource strain: Not on file     Food insecurity:     Worry: Not on file     Inability: Not on file     Transportation needs:     Medical: Not on file     Non-medical: Not on file   Tobacco Use     Smoking status: Former Smoker     Smokeless tobacco: Never Used     Tobacco comment: quit for a long time per pt.   Substance and Sexual Activity     Alcohol use: Yes     Alcohol/week: 0.0 standard drinks     Comment: Occasionally.      Drug use: No     Sexual activity: Not on file   Lifestyle     Physical activity:     Days per week: Not on file     Minutes per session: Not on file     Stress: Not on file   Relationships     Social connections:     Talks on phone: Not on file     Gets together: Not on file     Attends [employer] service: Not on file     Active member of club or organization: Not on file     Attends meetings of clubs or organizations: Not on file     Relationship status: Not on file     Intimate partner violence:     Fear of current or ex partner: Not on file     Emotionally abused: Not on file     Physically abused: Not on file     Forced sexual activity: Not on file   Other Topics Concern     Parent/sibling w/ CABG, MI or angioplasty before 65F 55M? Not Asked   Social History Narrative     Not on file          Allergies   Allergen Reactions     No Known Drug Allergy        No results found for this or any previous visit (from the past 24 hour(s)).         Review of Systems:   C: NEGATIVE for fatigue, unexpected change in weight  E: NEGATIVE for acute vision problems or changes  R: NEGATIVE for significant  cough or shortness of breath  CV: NEGATIVE for chest pain, palpitations or new or worsening peripheral edema  P: NEGATIVE for changes in mood or affect     Complete ROS negative unless noted above or in HPI       Physical Exam:     Vitals:    09/30/19 0921   BP: 110/76   Pulse: 76   Resp: 18   SpO2: 95%   Weight: 68.4 kg (150 lb 12.8 oz)     Body mass index is 24.16 kg/m .    GENERAL APPEARANCE: alert and no acute distress  PSYCH: mentation appears normal and affect normal/bright  RESP: lungs clear to auscultation - no rales, rhonchi or wheezes  CV: regular rate and rhythm, normal S1 S2, no S3 or S4 and no murmur, click or rub -  EXT: no cyanosis or edema in lower extremities  SKIN: no venous stasis changes

## 2019-10-01 NOTE — RESULT ENCOUNTER NOTE
Left in MyChart for daughter:  Karri,  Your Sodium level is now normal at 141 and kidney function has normalized as well. You were likely dehydrated a couple days ago. Keep drinking plenty of fluids.   Call clinic with questions.  Dr. Dubois

## 2019-10-07 NOTE — PROGRESS NOTES
Preceptor Attestation:  Patient's case reviewed and discussed with  Patient seen and discussed with the resident..  I agree with written assessment and plan of care.  Supervising Physician:  Gloria Deshpande MD  [facility]

## 2019-10-15 ENCOUNTER — OFFICE VISIT (OUTPATIENT)
Dept: FAMILY MEDICINE | Facility: CLINIC | Age: 84
End: 2019-10-15
Payer: COMMERCIAL

## 2019-10-15 DIAGNOSIS — J18.9 PNEUMONIA OF LEFT LOWER LOBE DUE TO INFECTIOUS ORGANISM: Primary | ICD-10-CM

## 2019-10-16 ASSESSMENT — MIFFLIN-ST. JEOR: SCORE: 1285.42

## 2019-10-21 VITALS
HEART RATE: 80 BPM | OXYGEN SATURATION: 96 % | HEIGHT: 66 IN | SYSTOLIC BLOOD PRESSURE: 130 MMHG | WEIGHT: 154 LBS | DIASTOLIC BLOOD PRESSURE: 82 MMHG | BODY MASS INDEX: 24.75 KG/M2

## 2019-10-22 NOTE — PROGRESS NOTES
HPI:       Jerardo Paredes is a 89 year old  male who presents to address the following concerns:    Patient presenting to follow up recent epidose of pheumonia diagnosed after visit 9/26/19.   Was treated with levofloxacin 750mg daily x 5 days.  Took full course.  Since visit 9/26/19 reports that his fatigue is mildly improved but not back to baseline.   Was hoping to be feeling better by today's visit.     Daughter does note that when she picked up dad for today's visit that he was dressed and waiting for her at his facility which would not have happened a few weeks ago.  Ambulating without assist.      Reports that overall has been experiencing declining energy levels and would like to try to address this if possible.  Understands that age can play a role in this.    Denies new SOB, fevers, sweats, chills, nausea, vomiting.            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    Current Outpatient Medications   Medication     vitamin D3 (CHOLECALCIFEROL) 400 units capsule     No current facility-administered medications for this visit.               Review of Systems:   ROS as described above.  Denies F/S/C/N/V/SOB/CP          Physical Exam:     Vitals:    10/16/19 1658   BP: 130/82   Pulse: 80   SpO2: 96%   Weight:  "69.9 kg (154 lb)   Height: 1.675 m (5' 5.95\")     Body mass index is 24.9 kg/m .    GEN: patient sitting comfortably in NAD. Frail appearing  HEEN: Head is atraumatic, normocephalic, eyes anicteric, mucous membranes moist  CV: RRR w/o M/R/G  PULM: CTAB without w/r/r  ABD: soft, nontender, bowel sounds present  NEURO: Alert and oriented x3.  No focal motor abnormalities.  Face symmetric.  PSYCH: appropriate  SKIN: No rashes, bruising, or other lesions    Assessment and Plan     Follow up pneumonia:   -slowly improving in areas of energy and stamina  -encouraged family to stay the course    Follow up labs.  Will send copy of labs to family from recent visit. Epic not currently available.     Options for treatment and follow-up care were reviewed with the patient and/or guardian. Jerardo Paredes and/or guardian engaged in the decision making process and verbalized understanding of the options discussed and agreed with the final plan.    Gloria Deshpande MD            "

## 2019-11-06 ENCOUNTER — HEALTH MAINTENANCE LETTER (OUTPATIENT)
Age: 84
End: 2019-11-06

## 2020-01-20 ENCOUNTER — OFFICE VISIT (OUTPATIENT)
Dept: FAMILY MEDICINE | Facility: CLINIC | Age: 85
End: 2020-01-20
Payer: COMMERCIAL

## 2020-01-20 VITALS
BODY MASS INDEX: 24.96 KG/M2 | WEIGHT: 154.4 LBS | HEART RATE: 91 BPM | OXYGEN SATURATION: 95 % | DIASTOLIC BLOOD PRESSURE: 96 MMHG | RESPIRATION RATE: 16 BRPM | SYSTOLIC BLOOD PRESSURE: 164 MMHG

## 2020-01-20 DIAGNOSIS — R05.9 COUGH: Primary | ICD-10-CM

## 2020-01-20 LAB
FLUAV AG UPPER RESP QL IA.RAPID: NEGATIVE
FLUBV AG UPPER RESP QL IA.RAPID: NEGATIVE

## 2020-01-20 NOTE — PROGRESS NOTES
Preceptor Attestation:   Patient seen, evaluated and discussed with the resident. I have verified the content of the note, which accurately reflects my assessment of the patient and the plan of care.  I reviewed the CXR personally.  Supervising Physician:Kamron Bishop MD  [facility]

## 2020-01-20 NOTE — PROGRESS NOTES
"       HPI:   Jerardo Paredes is a 89 year old  male who presents for:    Chief Complaint   Patient presents with     Cough     cough for one week, tightness in chest     Very deep cough that has been going on week. sometimes productive. Some congestion and rhinorrhea noted. No pharyngitis. Fevers once in a while but not now. Feels SOB \"once in a while\" also but now. Daughter thinks he may be weaker now. Daughter notes he had a pneumonia in September diagnosed via CXR in clinic but that was initially missed and then overead by the radiologist as an infiltrate. She is very concerned this could be another pneumonia.    High blood pressure: did not have time to discuss today. Follow-up soon regarding this.          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)     Benign non-nodular prostatic hyperplasia with lower urinary tract symptoms     CKD (chronic kidney disease) stage 3, GFR 30-59 ml/min (H)     Malnutrition of mild degree (H)     Osteoarthrosis     Frequent PVCs     Abnormal sleep oximetry       Current Outpatient Medications   Medication Sig Dispense Refill     vitamin D3 (CHOLECALCIFEROL) 400 units capsule Take 1 capsule (400 Units) by mouth daily (Patient not taking: Reported on 1/20/2020) 90 capsule 3       Social History     Tobacco Use     Smoking status: Former Smoker     Smokeless tobacco: Never Used     Tobacco comment: quit for a long time per pt.   Substance Use Topics     Alcohol use: Yes     Alcohol/week: 0.0 standard drinks     Comment: Occasionally.      Drug use: No       Social History     Social History Narrative     Not on file       Allergies   Allergen Reactions     No Known Drug Allergy        No results found for this or any previous visit (from the past 24 hour(s)).         Review of Systems:    ROS: 10 point ROS neg other than the symptoms noted above in " the HPI.         Physical Exam:     Vitals:    01/20/20 1057   BP: (!) 164/96   Pulse: 91   Resp: 16   SpO2: 95%   Weight: 70 kg (154 lb 6.4 oz)     Body mass index is 24.96 kg/m .    General: Alert, well-appearing male in NAD  HEENT: PERRL, moist oral mucus membranes, oropharynx is reddened but with no exudates, TM's normal appearing, turbinates are reddened but nonswollen  Pulm: CTA BL, no tachypnea  CV: RRR, no murmur  Abd: soft, NTND, no masses    Assessment and Plan   (R05) Cough  (primary encounter diagnosis)  Comment: CXR and influenza swab performed today in clinic. Both negative. However, CXR was read negative by in clinic physician as well a couple months ago before overread by radiologist and diagnosed with pneumonia. I compared these images today in clinic and very similar so will wait for radiologist read again and inform patient if positive. Otherwise, will treat symptomatically and discussed ways to do this with return precautions given.   Plan: XR CHEST 2 VW, Influenza A/B Antigen ([facility])            Follow up: Follow-up on BP once feeling better from acute illness  Options for treatment and follow-up care were reviewed with the patient and/or guardian. Jerardo Armandshawn and/or guardian engaged in the decision making process and verbalized understanding of the options discussed and agreed with the final plan.    Dr. Fabian Correa, PGY3    Precepted with: Kamron Bishop MD

## 2020-01-22 NOTE — RESULT ENCOUNTER NOTE
Called and discussed results with daughter who is one of his primary caregivers. Shared decision making about findings and decided to not pursue CT chest at this time as patient feeling improved.  If symptoms reoccur or more chronic could consider.     Dr. Fabian Correa

## 2020-02-16 ENCOUNTER — HEALTH MAINTENANCE LETTER (OUTPATIENT)
Age: 85
End: 2020-02-16

## 2020-02-27 ENCOUNTER — OFFICE VISIT (OUTPATIENT)
Dept: FAMILY MEDICINE | Facility: CLINIC | Age: 85
End: 2020-02-27
Payer: COMMERCIAL

## 2020-02-27 VITALS
OXYGEN SATURATION: 93 % | WEIGHT: 153.6 LBS | BODY MASS INDEX: 24.83 KG/M2 | HEART RATE: 81 BPM | TEMPERATURE: 97.4 F | DIASTOLIC BLOOD PRESSURE: 90 MMHG | SYSTOLIC BLOOD PRESSURE: 150 MMHG

## 2020-02-27 DIAGNOSIS — R29.898 WEAKNESS OF BOTH LOWER EXTREMITIES: ICD-10-CM

## 2020-02-27 DIAGNOSIS — R29.6 FALLS FREQUENTLY: Primary | ICD-10-CM

## 2020-02-27 NOTE — PROGRESS NOTES
"       Subjective:   Jerardo Paredes is a 89 year old year old male with history of CVA with mild residual left leg weakness, CKD3, who presents to clinic today for the following health issues:    Chief Complaint   Patient presents with     Fall     frequent falls past week,      Medication Reconciliation     Hypertension     post fall and pulse in 90's     Falls:  Fell 4 days ago and again today at his assisted living facility. Yesterday, was playing pool with his jose and got tangled up in his legs while resetting the balls for his next play. He felt onto his knees and then to his side. The time prior he doesn't really recall but states it was witnessed by 4 women who reside in his AL. They stated he was using his 4 wheeled walked to get up from a chair and the walker \"got away from him\" causing him to fall forward. He denies hitting his head during either encounter, he is not on blood thinners, he denies LOC. He currently is in no pain. He has not had recent CP, palpitations, SOB, dizziness, lightheadedness. He has been incontinent of urine since his CVA and wears depends. He denies fevers, cough, N/V/D. He does feel his legs have been getting more weak over time. He otherwise feels well.          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     ALLERGIES:     Allergies   Allergen Reactions     No Known Drug Allergy           Objective:     Vitals:    02/27/20 1607   BP: (!) 150/90   Pulse: 81   Temp: 97.4  F (36.3  C)   TempSrc: Tympanic   SpO2: 93% "   Weight: 69.7 kg (153 lb 9.6 oz)     Body mass index is 24.83 kg/m .  No results found for this or any previous visit (from the past 24 hour(s)).    General: Alert, well-appearing elderly male in NAD  Head: atraumatic   Skin: no visible signs of trauma   Pulm: CTA BL, no tachypnea  CV: RRR with frequent PVCs, no murmur  Neuro: CN2-12 intact, no facial droops, 5/5 strength of upper extremities, 4/5 strength of left LE (chronic), 5/5 on right, sensation intact   Psych: Mood appropriate to visit content, full affect, rational thought content and process    Assessment and Plan:   1. Falls frequently  2. Weakness of both lower extremities  Two witnessed mechanical falls at patient's halfway in the past week. No LOC, head trauma, and patient is not on blood thinners. Besides gradually progressing LE weakness, he otherwise feels well. Vitals stable, unchanged neuro exam (chronic mild RLE weakness 2/2 prior CVA), and no signs of trauma. Suspect gradual deconditioning and progressive fragility given age. Patient and his daughter, Kelsey agreeable to a trial home home PT. If falls continue would consider more thorough workup to rule out secondary causes but no current indication for that at this time.   - PHYSICAL THERAPY REFERRAL; Future    Options for treatment and follow-up care were reviewed with the patient and/or guardian. Jerardo Armandshawn and/or guardian engaged in the decision making process and verbalized understanding of the options discussed and agreed with the final plan.    Flora Camacho DO  [facility] Medicine Resident    Precepted with: Norma Finley DO

## 2020-02-28 NOTE — PATIENT INSTRUCTIONS
Referral for :     Physical therapy    LOCATION/PLACE/Provider :    [facility]   DATE & TIME :    Faxed to location  PHONE :     [phone]  FAX :    [phone]  Appointment made by clinic staff/:    Brooke

## 2020-03-01 ENCOUNTER — MEDICAL CORRESPONDENCE (OUTPATIENT)
Dept: HEALTH INFORMATION MANAGEMENT | Facility: CLINIC | Age: 85
End: 2020-03-01

## 2020-03-02 ENCOUNTER — MEDICAL CORRESPONDENCE (OUTPATIENT)
Dept: HEALTH INFORMATION MANAGEMENT | Facility: CLINIC | Age: 85
End: 2020-03-02

## 2020-03-03 NOTE — PROGRESS NOTES
Preceptor Attestation:   Patient seen, evaluated and discussed with the resident. I have verified the content of the note, which accurately reflects my assessment of the patient and the plan of care.  Supervising Physician:Norma Finley DO [facility]

## 2020-03-10 ENCOUNTER — MEDICAL CORRESPONDENCE (OUTPATIENT)
Dept: HEALTH INFORMATION MANAGEMENT | Facility: CLINIC | Age: 85
End: 2020-03-10

## 2020-03-13 ENCOUNTER — MEDICAL CORRESPONDENCE (OUTPATIENT)
Dept: HEALTH INFORMATION MANAGEMENT | Facility: CLINIC | Age: 85
End: 2020-03-13

## 2020-04-06 ENCOUNTER — MEDICAL CORRESPONDENCE (OUTPATIENT)
Dept: HEALTH INFORMATION MANAGEMENT | Facility: CLINIC | Age: 85
End: 2020-04-06

## 2020-05-01 ENCOUNTER — MEDICAL CORRESPONDENCE (OUTPATIENT)
Dept: HEALTH INFORMATION MANAGEMENT | Facility: CLINIC | Age: 85
End: 2020-05-01

## 2020-05-18 ENCOUNTER — TELEPHONE (OUTPATIENT)
Dept: FAMILY MEDICINE | Facility: CLINIC | Age: 85
End: 2020-05-18

## 2020-05-18 NOTE — TELEPHONE ENCOUNTER
[facility] phone call message- general phone call:    Reason for call: Caller states that patient has a skin tear on his left elbow. She states this happened today at 6AM. It's about a quarter size, bleeding is controlled, patient is fine, no pain. Caller states they will be doing their house protocol for wound care until healed. She would like to verify if Dr. Deshpande would like to follow a specific wound care plan. Please call and advise.     Return call needed: Yes    OK to leave a message on voice mail? Yes    Primary language: English      needed? No    Call taken on May 18, 2020 at 12:17 PM by Monica Sheldon

## 2020-05-19 NOTE — TELEPHONE ENCOUNTER
Called and advised  okmonica with [place] wound protocols. Marlyn stated she will fax over for  to sign. Ame MACARIO

## 2020-05-26 ENCOUNTER — MEDICAL CORRESPONDENCE (OUTPATIENT)
Dept: HEALTH INFORMATION MANAGEMENT | Facility: CLINIC | Age: 85
End: 2020-05-26

## 2020-11-29 ENCOUNTER — HEALTH MAINTENANCE LETTER (OUTPATIENT)
Age: 85
End: 2020-11-29

## 2020-12-08 ENCOUNTER — TRANSFERRED RECORDS (OUTPATIENT)
Dept: HEALTH INFORMATION MANAGEMENT | Facility: CLINIC | Age: 85
End: 2020-12-08

## 2020-12-15 PROBLEM — C44.320 SQUAMOUS CELL CARCINOMA OF SKIN OF FACE: Status: ACTIVE | Noted: 2020-12-15

## 2020-12-22 ENCOUNTER — MEDICAL CORRESPONDENCE (OUTPATIENT)
Dept: HEALTH INFORMATION MANAGEMENT | Facility: CLINIC | Age: 85
End: 2020-12-22

## 2021-04-10 ENCOUNTER — HEALTH MAINTENANCE LETTER (OUTPATIENT)
Age: 86
End: 2021-04-10

## 2021-05-28 ENCOUNTER — RECORDS - HEALTHEAST (OUTPATIENT)
Dept: ADMINISTRATIVE | Facility: CLINIC | Age: 86
End: 2021-05-28

## 2021-05-29 ENCOUNTER — RECORDS - HEALTHEAST (OUTPATIENT)
Dept: ADMINISTRATIVE | Facility: CLINIC | Age: 86
End: 2021-05-29

## 2021-05-30 ENCOUNTER — RECORDS - HEALTHEAST (OUTPATIENT)
Dept: ADMINISTRATIVE | Facility: CLINIC | Age: 86
End: 2021-05-30

## 2021-05-31 ENCOUNTER — RECORDS - HEALTHEAST (OUTPATIENT)
Dept: ADMINISTRATIVE | Facility: CLINIC | Age: 86
End: 2021-05-31

## 2021-05-31 VITALS — HEIGHT: 67 IN | WEIGHT: 153 LBS | BODY MASS INDEX: 24.01 KG/M2

## 2021-05-31 VITALS — HEIGHT: 67 IN | WEIGHT: 150 LBS | BODY MASS INDEX: 23.54 KG/M2

## 2021-06-01 VITALS — HEIGHT: 67 IN | BODY MASS INDEX: 23.54 KG/M2 | WEIGHT: 150 LBS

## 2021-06-01 VITALS — BODY MASS INDEX: 23.63 KG/M2 | WEIGHT: 147 LBS | HEIGHT: 66 IN

## 2021-06-02 VITALS — BODY MASS INDEX: 23.46 KG/M2 | WEIGHT: 146 LBS | HEIGHT: 66 IN

## 2021-06-10 NOTE — ANESTHESIA CARE TRANSFER NOTE
Last vitals:   Vitals:    05/17/17 1150   BP: 99/58   Pulse: 83   Resp: 16   Temp: 36.7  C (98.1  F)   SpO2: 92%     Patient's level of consciousness is drowsy  Spontaneous respirations: yes  Maintains airway independently: yes  Dentition unchanged: yes  Oropharynx: oropharynx clear of all foreign objects    QCDR Measures:  ASA# 20 - Surgical Safety Checklist: ASA20A - Safety Checks Done  PQRS# 430 - Adult PONV Prevention: NA - Not adult patient, not GA or 3 or more risk factors NOT present  ASA# 8 - Peds PONV Prevention: NA - Not pediatric patient, not GA or 2 or more risk factors NOT present  PQRS# 424 - Soco-op Temp Management: 4559F - At least one body temp DOCUMENTED => 35.5C or 95.9F within required timeframe  PQRS# 426 - PACU Transfer Protocol: - Transfer of care checklist used  ASA# 14 - Acute Post-op Pain: ASA14B - Patient did NOT experience pain >= 7 out of 10    I completed my SBAR handoff to the receiving nurse per policy and procedure.To 2nd stage recovery,sleepy but arousable,no C/O pain or nausea,IV patent and infusing, VSS, Report given

## 2021-06-10 NOTE — ANESTHESIA POSTPROCEDURE EVALUATION
Patient: Jerardo Paredes  STAGE I INTERSTIM  Anesthesia type: MAC    Patient location: Phase II Recovery  Last vitals:   Vitals:    05/17/17 1215   BP: 108/61   Pulse: 69   Resp: 18   Temp:    SpO2: 94%     Post vital signs: stable  Level of consciousness: awake and responds to simple questions  Post-anesthesia pain: pain controlled  Post-anesthesia nausea and vomiting: no  Pulmonary: unassisted, return to baseline  Cardiovascular: stable and blood pressure at baseline  Hydration: adequate  Anesthetic events: no    QCDR Measures:  ASA# 11 - Soco-op Cardiac Arrest: ASA11B - Patient did NOT experience unanticipated cardiac arrest  ASA# 12 - Soco-op Mortality Rate: ASA12B - Patient did NOT die  ASA# 13 - PACU Re-Intubation Rate: NA - No ETT / LMA used for case  ASA# 10 - Composite Anes Safety: ASA10A - No serious adverse event  ASA# 38 - New Corneal Injury: ASA38A - No new exposure keratitis or corneal abrasion in PACU    Additional Notes:

## 2021-06-10 NOTE — ANESTHESIA PREPROCEDURE EVALUATION
Anesthesia Evaluation      Patient summary reviewed     Airway   Mallampati: II  Neck ROM: limited   Pulmonary - normal exam   (+) a smoker                         Cardiovascular - negative ROS and normal exam  Rhythm: regular  Rate: normal,         Neuro/Psych    (+) CVA ,     Endo/Other       GI/Hepatic/Renal    (+)   chronic renal disease CRI,           Dental    (+) upper dentures and lower dentures                Chemistry        Component Value Date/Time     04/19/2016 0946    K 3.6 04/19/2016 0946     (H) 04/19/2016 0946    CO2 27 04/19/2016 0946    BUN 18 04/19/2016 0946    CREATININE 0.92 04/19/2016 0946     04/19/2016 0946        Component Value Date/Time    CALCIUM 8.0 (L) 04/19/2016 0946    ALKPHOS 60 03/13/2014 0605    AST 26 03/13/2014 0605    ALT 29 03/13/2014 0605    BILITOT 0.8 03/13/2014 0605                   Anesthesia Plan  Planned anesthetic: MAC    ASA 3     Anesthetic plan and risks discussed with: patient    Post-op plan: routine recovery

## 2021-06-11 NOTE — ANESTHESIA POSTPROCEDURE EVALUATION
Patient: Jerardo Paredes  REMOVAL OF INTERSTIM LEAD  Anesthesia type: MAC    Patient location: Phase II Recovery  Last vitals:   Vitals:    06/05/17 0948   BP: 91/55   Pulse: 71   Resp: 16   Temp:    SpO2: 92%     Post vital signs: stable  Level of consciousness: awake, alert and responds to simple questions  Post-anesthesia pain: pain controlled  Post-anesthesia nausea and vomiting: no  Pulmonary: unassisted, return to baseline  Cardiovascular: stable and blood pressure at baseline  Hydration: adequate  Anesthetic events: no    QCDR Measures:  ASA# 11 - Soco-op Cardiac Arrest: ASA11B - Patient did NOT experience unanticipated cardiac arrest  ASA# 12 - Soco-op Mortality Rate: ASA12B - Patient did NOT die  ASA# 13 - PACU Re-Intubation Rate: ASA13B - Patient did NOT require a new airway mgmt  ASA# 10 - Composite Anes Safety: ASA10A - No serious adverse event  ASA# 38 - New Corneal Injury: ASA38A - No new exposure keratitis or corneal abrasion in PACU    Additional Notes:

## 2021-06-11 NOTE — ANESTHESIA CARE TRANSFER NOTE
Last vitals:   Vitals:    06/05/17 0809   BP: 123/74   Pulse: 84   Resp: 18   Temp: 36.5  C (97.7  F)   SpO2: 96%     Patient's level of consciousness is awake  Spontaneous respirations: yes  Maintains airway independently: yes  Dentition unchanged: yes  Oropharynx: oropharynx clear of all foreign objects    QCDR Measures:  ASA# 20 - Surgical Safety Checklist: ASA20A - Safety Checks Done  PQRS# 430 - Adult PONV Prevention: 4558F - Pt received => 2 anti-emetic agents (different classes) preop & intraop  ASA# 8 - Peds PONV Prevention: 4558F - Pt received => 2 anti-emetic agents (different classes) preop & intraop  PQRS# 424 - Soco-op Temp Management: 4559F - At least one body temp DOCUMENTED => 35.5C or 95.9F within required timeframe  PQRS# 426 - PACU Transfer Protocol: - Transfer of care checklist used  ASA# 14 - Acute Post-op Pain: ASA14B - Patient did NOT experience pain >= 7 out of 10   The patient is Spont Breathing, responding to commands,  Reflexes  Intact.  VSS

## 2021-06-11 NOTE — ANESTHESIA PREPROCEDURE EVALUATION
Anesthesia Evaluation      Patient summary reviewed   No history of anesthetic complications     Airway   Mallampati: II  Neck ROM: full   Pulmonary - normal exam    breath sounds clear to auscultation  (+) a smoker (quit 1980)                         Cardiovascular - negative ROS and normal exam  Exercise tolerance: > or = 4 METS  ECG reviewed  Rhythm: regular  Rate: normal,         Neuro/Psych    (+) neuromuscular disease,  CVA ,     Endo/Other - negative ROS      GI/Hepatic/Renal    (+)   chronic renal disease CRI,           Dental    (+) upper dentures and lower dentures                       Anesthesia Plan  Planned anesthetic: MAC    ASA 3     Anesthetic plan and risks discussed with: patient    Post-op plan: routine recovery

## 2021-06-19 NOTE — LETTER
Letter by Russ Solares MD at      Author: Russ Solares MD Service: -- Author Type: --    Filed:  Encounter Date: 4/2/2019 Status: (Other)         Jerardo Paredes  [address]  [facility] [address]      April 2, 2019      Dear Jerardo,    This letter is to remind you that you will be due for your follow up appointment with Dr. Russ Solares. To help ensure you are in the best health possible, a regular follow-up with your cardiologist is essential.     Please call our Patient Scheduling Line at [phone] to schedule your appointment at your earliest convenience.  If you have recently scheduled an appointment, please disregard this letter.    We look forward to seeing you again. As always, we are available at the number  above for any questions or concerns you may have.      Sincerely,     The Physicians and Staff of [facility]

## 2021-06-21 NOTE — PROGRESS NOTES
Order for Durable Medical Equipment was processed and equipment ordered.     DME provider: [facility]    Date Faxed: 11/6/18    Ordering Provider: Dr. James    Equipment ordered: [employer]    Fax Number: [phone]

## 2021-06-21 NOTE — PROGRESS NOTES
DME provider: [employer]    Date Faxed: 11/6/18    Ordering Provider: Dr. Huff    Ordered: [employer]    Fax Number: [phone]

## 2021-06-21 NOTE — PROGRESS NOTES
Dear Dr. Gloria Deshpande Md  [address] [place] [address]    Thank you for the opportunity to participate in the care of Mr. Jerardo Paredes.    He is a 89 y.o. male who comes to the clinic with a chief complaint of excessive daytime sleepiness that is been going on for approximately 3 months.  While the patient denies any episodes of witnessed apnea or snoring.  Patient was found to have frequent PVCs and trigeminy's on EKG a few months ago.  Along with the patient's sense of fatigue the patient's daughter would like him to get evaluated.  The patient's review of system is otherwise unremarkable.     Ideal Sleep-Wake Cycle(devoid of societal pressure):    Patient would try to initiate sleep at around 7PM with a sleep latency of 15 minutes. The patient would have 3-4 awakening. Final wake up time is around 6-6:30AM.    Past Medical History  Past Medical History:   Diagnosis Date     Anemia      BPH (benign prostatic hyperplasia)      Cataract      Chronic kidney disease     Stage 3     CVA (cerebral vascular accident) (H) 2014    Weakness in Left UE     Finger amputation, traumatic     right index, industrial accident     Hearing loss      Osteoarthritis      Overactive bladder      Skin cancer         Past Surgical History  Past Surgical History:   Procedure Laterality Date     CERVICAL LAMINECTOMY  1972     CHOLECYSTECTOMY  2005     IN IMPLANT PERIPH/GASTRIC NEUROSTIM/ N/A 6/5/2017    Procedure: REMOVAL OF INTERSTIM LEAD;  Surgeon: Fredi Perez MD;  Location: [facility];  Service: Urology     SKIN CANCER EXCISION       Stage 1 Interstim  05/17/2017     TIBIA FRACTURE SURGERY Left 1948     TRANSURETHRAL RESECTION OF PROSTATE          Meds  No current outpatient prescriptions on file.     No current facility-administered medications for this visit.         Allergies  Review of patient's allergies indicates no known allergies.     Social History  Social History     Social  History     Marital status: Single     Spouse name: N/A     Number of children: N/A     Years of education: N/A     Occupational History     Not on file.     Social History Main Topics     Smoking status: Former Smoker     Quit date: 1980     Smokeless tobacco: Never Used     Alcohol use Yes      Comment: rarely     Drug use: No     Sexual activity: Not on file     Other Topics Concern     Not on file     Social History Narrative        Family History  Family History   Problem Relation Age of Onset     Stroke Mother      Alcoholism Father         Review of Systems:  Constitutional: Negative except as noted in HPI.   Eyes: Negative except as noted in HPI.   ENT: Negative except as noted in HPI.   Cardiovascular: Negative except as noted in HPI.   Respiratory: Negative except as noted in HPI.   Gastrointestinal: Negative except as noted in HPI.   Genitourinary: Negative except as noted in HPI.   Musculoskeletal: Negative except as noted in HPI.   Integumentary: Negative except as noted in HPI.   Neurological: Negative except as noted in HPI.   Psychiatric: Negative except as noted in HPI.   Endocrine: Negative except as noted in HPI.   Hematologic/Lymphatic: Negative except as noted in HPI.      STOP BANG 11/6/2018   Do you snore loudly (louder than talking or loud enough to be heard through closed doors)? 0   Do you often feel tired, fatigued, or sleepy during daytime? 1   Has anyone observed you stop breathing in your sleep? 0   Do you have or are you being treated for high blood pressure? 0   BMI more than 35 kg/m2 0   Age over 50 years old? 1   Neck circumference greater than 16 inches? 0   Gender male? 1   Total Score 3   Epworths Sleepiness Scale 11/6/2018   Sitting and reading 2   Watching TV 1   Sitting, inactive in a public place (e.g. a theatre or a meeting) 3   As a passenger in a car for an hour without a break 0   Lying down to rest in the afternoon when circumstances permit 3   Sitting and talking to  "someone 1   Sitting quietly after a lunch without alcohol 3   In a car, while stopped for a few minutes in traffic 0   Total score 13   Rooming 11/6/2018   Usual bedtime 7pm   Sleep Latency 30-60 minutes   Awakenings 3+ time   Wake Up Time 630am   Weekends same   Energy Drinks 0   Coffee 1 cup qd   Cola 0   Difficulty falling asleep Yes   Difficulty staying asleep Yes   Excessive daytime tiredness Yes   Excessive daytime sleepiness Yes   Dozing off while driving No   Shift Worker No   Sleep Walking? No   Sleep Talking? No   Kicking or punching? No   Restless legs symptoms Yes       Physical Exam:  /76  Pulse 86  Ht 5' 6\" (1.676 m)  Wt 146 lb (66.2 kg)  SpO2 94%  BMI 23.57 kg/m2  BMI:Body mass index is 23.57 kg/(m^2).   GEN: NAD, frail, walks with a cane.  Head: Normocephalic.  EYES: PERRLA, EOMI  ENT: Oropharynx is clear, mallampatti class 4+ airway.   Nasal mucosa is moist without erythema  Neck : Thyroid is within normal limits. Neck size: 16 inches  CV: Regular rate and rhythm, S1 & S2 positive.  LUNGS: Bilateral breathsounds heard.   ABDOMEN: Positive bowel sounds in all quadrants, soft, no rebound or guarding  MUSCULOSKELETAL: No leg swelling  SKIN: warm, dry, no rashes  Neurological: Alert, oriented to time, place, and person.  Psych: normal mood, normal affect     Labs/Studies:     Lab Results   Component Value Date    WBC 4.3 04/19/2016    HGB 12.5 (L) 04/19/2016    HCT 38.1 (L) 04/19/2016    MCV 92 04/19/2016     04/19/2016         Chemistry        Component Value Date/Time     08/01/2018 1202    K 4.3 08/01/2018 1202     08/01/2018 1202    CO2 29 08/01/2018 1202    BUN 20 08/01/2018 1202    CREATININE 1.25 08/01/2018 1202    GLU 81 08/01/2018 1202        Component Value Date/Time    CALCIUM 8.7 08/01/2018 1202    ALKPHOS 60 03/13/2014 0605    AST 26 03/13/2014 0605    ALT 29 03/13/2014 0605    BILITOT 0.8 03/13/2014 0605            No results found for: FERRITIN  Lab Results "   Component Value Date    TSH 4.94 08/21/2018         Assessment and Plan:  In summary Jerardo Paredes is a 89 y.o. year old male here for sleep disturbance.  1.  Hypersomnia  I informed the patient and his daughter that I do not have enough evidence to proceed directly with a sleep study at this point in time.  I will however order a nocturnal oximetry study look for abnormalities.  If positive I will then proceed with an in lab sleep study.  2.  Other sleep disturbance  3.  History of cardiac arrhythmias    Patient verbalized understanding of these issues, agrees with the plan and all questions were answered today. Patient was given an opportuntity to voice any other symptoms or concerns not listed above. Patient did not have any other symptoms or concerns.         Rui Huff DO  Board Certified in Internal Medicine and Sleep Medicine  [facility].    (Note created with Dragon voice recognition and unintended spelling errors and word substitutions may occur)

## 2021-09-19 ENCOUNTER — HEALTH MAINTENANCE LETTER (OUTPATIENT)
Age: 86
End: 2021-09-19

## 2022-05-01 ENCOUNTER — HEALTH MAINTENANCE LETTER (OUTPATIENT)
Age: 87
End: 2022-05-01

## 2022-11-21 ENCOUNTER — HEALTH MAINTENANCE LETTER (OUTPATIENT)
Age: 87
End: 2022-11-21

## 2023-05-25 NOTE — LETTER
"March 15, 2018      Jerardo Paredes  [address]   [facility] [facility] [place] [address]        To Whom It May Concern,    My name is Dr Gloria Deshpande and I am the primary care physician for Mr Jerardo Paredes.  Mr Paredes has been a patient in my care for approximately 5 years.  I have been asked to evaluate suitability for current status in senior apartment living.    A wellness exam including frailty and cognitive assessment were completed 3/5/18.  These demonstrate some mild cognitive impairment but overall good function for a gentleman of 89 years.  On my assessment patient was alert to person, place, and time.  Exhibited good immediate recall and spatial/process reasoning, but did have difficulty with delayed recall.  Family discussed the addition of some hired \"helpers\" which I think is an excellent idea.  At this time I believe that Mr Paredes can safely remain in his apartment with these supports.      Patient does demonstrate some hearing and vision loss which should be addressed.      Sincerely,    Gloria Deshpande MD  " Yes

## 2023-06-02 ENCOUNTER — HEALTH MAINTENANCE LETTER (OUTPATIENT)
Age: 88
End: 2023-06-02

## 2024-08-31 NOTE — PROGRESS NOTES
Problem: Fall Injury Risk  Goal: Absence of Fall and Fall-Related Injury  Outcome: Progressing     Problem: Skin Injury Risk Increased  Goal: Skin Health and Integrity  Outcome: Progressing      "[facility]        Subjective     HPI:  Jerardo Paredes is a 89 year old male who presents for the following concerns:    CC: Rule out Urinary Tract Infection; Sleep Problem (Increase daytime sleeping); and Medication Reconciliation (Needs attention, per family member not taking any meds )    ?UTI: Urinary incontinence since March 2014 stroke. Nocturia 3-4x.Incontinance pads all the time. No dysuria or hematuria. Going to bathroom every 2 hours as a way of programmed bladder training. Has urge sometimes, but incontinence. Weak stream.Sense of incomplete emptying. No dribbling. No back pain. No fevers or chills.     Increased daytime sleepiness /fatigue over past 2-3 weeks per his daughter. She is concerned about a bladder infection.    Had prostate surgery TURP in 2003. Last on flomax 1 year ago. Tried bladder stimulation 1 year ago. Reports normal post residual flow    Not on any meds at all. \"Just couldn't find a good system\". Difficult to remember to take meds.     His daughter mentioned the heart rate monitor today during vitals collection went down to the 50s and she palpated an irregular heart beat she thinks.       ROS: constitutional, cardiovascular, respiratory, GI, , MSK systems reviewed and negative except as noted above.    Social:  Nonsmoker, good family supports- daughter here today          Objective   /77  Pulse 64  Temp 96.6  F (35.9  C)  Resp 20  Ht 5' 6.5\" (168.9 cm)  Wt 148 lb 3.2 oz (67.2 kg)  SpO2 97%  BMI 23.56 kg/m2 Body mass index is 23.56 kg/(m^2).    Wt Readings from Last 3 Encounters:   08/01/18 148 lb 3.2 oz (67.2 kg)   03/05/18 152 lb 9.6 oz (69.2 kg)   05/10/17 153 lb (69.4 kg)       Gen: frail, using cane, alert and provides own history with some slowed thought processing  HEENT: No asymmetry, MMM  CV: regularly irregular heart rate,  no murmurs. 2+ peripheral pulses  Lungs: CTAB, no wheezing or crackles, normal effort  Abd: soft, ND/NT  Back: no CVA " tenderness  Extremities: warm, no edema  Digital rectal exam deferred by pt    LABS/IMAGING personally reviewed today:    Recent Results (from the past 100 hour(s))   Urinalysis, Micro If ([facility])    Collection Time: 08/01/18 10:57 AM   Result Value Ref Range    Specific Gravity Urine 1.025 1.005 - 1.030    pH Urine 5.0 4.5 - 8.0    Leukocyte Esterase UR Negative NEGATIVE    Nitrite Urine Negative NEGATIVE    Protein UR 1+ (A) NEGATIVE    Glucose Urine Negative NEGATIVE    Ketones Urine Negative NEGATIVE    Urobilinogen mg/dL 0.2 E.U./dL 0.2 E.U./dL    Bilirubin UR Negative NEGATIVE    Blood UR 1+ (A) NEGATIVE    Color Urine Yellow     Clarity, urine Clear    Urine Microscopic ([facility])    Collection Time: 08/01/18 11:09 AM   Result Value Ref Range    WBC Urine <2 <5 /hpf    RBC Urine <2 <5 /hpf    Epithelial Cells UR None 0 - 2 /lpf    Mucous Urine Few NONE lpf    Casts Urine None NONE /lpf    Crystal Urine None NONE /lpf    Bacteria Wet Prep Few None     EKG today: first degree AV block, RBBB, frequent PVCs, Sinus rhythm with VR 76 (computer read this as possible ectopic atrial rhythm but a p wave is seen for every QRS), no LVH. Stable from prior in 2017. Reviewed both images.       EKG dated 5/10/17 was reviewed: First degree AV block, Sinus rhythm with vent rate of 59 bpm, Slightly leftward axis, no LVH by voltage criteria, Right Bundle Branch Block, unchanged from previous tracing description dated 3/19/14.       Assessment & Plan     89 year old male with:  Urinary frequency  UA negative, discussed not likely infection. Pre-void residual was only 40cc (last void about 1hr prior). Not retaining urine. Likely some neurogenic bladder component given his incontinence is an issue since stroke in 2014.  LUTS persist despite TURP in 2003. Reasonable to restart flomax, and pt and his daughter will think about this. They declined reassessment with urology which is also understandable. Would not likely benefit from  oxybutynin and has risk profile in elderly.  - Urinalysis, Micro If ([facility])  - Urine Microscopic ([facility])    Irregular heart beat, ?bradycardia, and fatigue for 3 weeks.   Stable EKG today compared to prior- see above. There are new PVCs. Due to concern that his HR was down in the 50s during our vitals collection, daughter agrees with Holter monitoring for 24 hours. Will also check electrolytes in setting of fatigue.  - EKG 12-lead complete w/read - Clinics  - Magnesium ([facility])  - Phosphorus ([facility])  - Holter Monitor 24 hour - Adult; Future  - Basic Metabolic Profile ([facility])    - will call with results    Follow up:   Return to care as needed if symptoms worsen or fail to improve.    Norma Bishop MD    Precepted today with: Sarah Soriano MD        -------  PMH:  Patient Active Problem List   Diagnosis     Tingling in left arm     HL (hearing loss)     Weakness of left arm     ACP (advance care planning)     Cerebral infarction due to vascular occlusion (H)     Benign non-nodular prostatic hyperplasia with lower urinary tract symptoms     CKD (chronic kidney disease) stage 3, GFR 30-59 ml/min      Current Outpatient Prescriptions   Medication     aspirin 81 MG chewable tablet     diclofenac (VOLTAREN) 1 % GEL topical gel     simvastatin (ZOCOR) 40 MG tablet     tamsulosin (FLOMAX) 0.4 MG 24 hr capsule     No current facility-administered medications for this visit.       ALLERGIES: No known drug allergy    Options for treatment and follow-up care were reviewed with the patient. Jerardo Paredes engaged in the decision making process and verbalized understanding of the options discussed and agreed with the final plan.